# Patient Record
Sex: FEMALE | Race: WHITE | Employment: OTHER | URBAN - METROPOLITAN AREA
[De-identification: names, ages, dates, MRNs, and addresses within clinical notes are randomized per-mention and may not be internally consistent; named-entity substitution may affect disease eponyms.]

---

## 2021-01-06 LAB
ALBUMIN SERPL-MCNC: 4.2 G/DL (ref 3.5–4.6)
ALP BLD-CCNC: 60 U/L (ref 40–130)
ALT SERPL-CCNC: 18 U/L (ref 0–33)
ANION GAP SERPL CALCULATED.3IONS-SCNC: 14 MEQ/L (ref 9–15)
AST SERPL-CCNC: 22 U/L (ref 0–35)
BILIRUB SERPL-MCNC: 0.5 MG/DL (ref 0.2–0.7)
BUN BLDV-MCNC: 22 MG/DL (ref 8–23)
CALCIUM SERPL-MCNC: 9.8 MG/DL (ref 8.5–9.9)
CHLORIDE BLD-SCNC: 104 MEQ/L (ref 95–107)
CHOLESTEROL, TOTAL: 172 MG/DL (ref 0–199)
CO2: 25 MEQ/L (ref 20–31)
CREAT SERPL-MCNC: 0.63 MG/DL (ref 0.5–0.9)
GFR AFRICAN AMERICAN: >60
GFR NON-AFRICAN AMERICAN: >60
GLOBULIN: 3.2 G/DL (ref 2.3–3.5)
GLUCOSE BLD-MCNC: 89 MG/DL (ref 70–99)
HDLC SERPL-MCNC: 83 MG/DL (ref 40–59)
LDL CHOLESTEROL CALCULATED: 75 MG/DL (ref 0–129)
POTASSIUM SERPL-SCNC: 4 MEQ/L (ref 3.4–4.9)
SODIUM BLD-SCNC: 143 MEQ/L (ref 135–144)
TOTAL PROTEIN: 7.4 G/DL (ref 6.3–8)
TRIGL SERPL-MCNC: 68 MG/DL (ref 0–150)

## 2022-02-15 ENCOUNTER — OFFICE VISIT (OUTPATIENT)
Dept: PAIN MANAGEMENT | Age: 77
End: 2022-02-15
Payer: MEDICARE

## 2022-02-15 VITALS
WEIGHT: 159 LBS | DIASTOLIC BLOOD PRESSURE: 70 MMHG | BODY MASS INDEX: 29.26 KG/M2 | TEMPERATURE: 97.3 F | SYSTOLIC BLOOD PRESSURE: 130 MMHG | HEIGHT: 62 IN

## 2022-02-15 DIAGNOSIS — M47.817 LUMBOSACRAL SPONDYLOSIS WITHOUT MYELOPATHY: ICD-10-CM

## 2022-02-15 DIAGNOSIS — M79.604 RIGHT LEG PAIN: Primary | ICD-10-CM

## 2022-02-15 PROCEDURE — 99214 OFFICE O/P EST MOD 30 MIN: CPT | Performed by: NURSE PRACTITIONER

## 2022-02-15 PROCEDURE — 1090F PRES/ABSN URINE INCON ASSESS: CPT | Performed by: NURSE PRACTITIONER

## 2022-02-15 PROCEDURE — G8417 CALC BMI ABV UP PARAM F/U: HCPCS | Performed by: NURSE PRACTITIONER

## 2022-02-15 PROCEDURE — G8427 DOCREV CUR MEDS BY ELIG CLIN: HCPCS | Performed by: NURSE PRACTITIONER

## 2022-02-15 PROCEDURE — 1123F ACP DISCUSS/DSCN MKR DOCD: CPT | Performed by: NURSE PRACTITIONER

## 2022-02-15 PROCEDURE — 4040F PNEUMOC VAC/ADMIN/RCVD: CPT | Performed by: NURSE PRACTITIONER

## 2022-02-15 PROCEDURE — 1036F TOBACCO NON-USER: CPT | Performed by: NURSE PRACTITIONER

## 2022-02-15 PROCEDURE — G8484 FLU IMMUNIZE NO ADMIN: HCPCS | Performed by: NURSE PRACTITIONER

## 2022-02-15 PROCEDURE — G8400 PT W/DXA NO RESULTS DOC: HCPCS | Performed by: NURSE PRACTITIONER

## 2022-02-15 RX ORDER — GABAPENTIN 300 MG/1
300 CAPSULE ORAL 3 TIMES DAILY
Qty: 90 CAPSULE | Refills: 0 | Status: SHIPPED | OUTPATIENT
Start: 2022-02-15 | End: 2022-03-08 | Stop reason: SDUPTHER

## 2022-02-15 ASSESSMENT — ENCOUNTER SYMPTOMS
SORE THROAT: 0
ABDOMINAL PAIN: 0
SHORTNESS OF BREATH: 0
BACK PAIN: 1

## 2022-02-15 NOTE — PROGRESS NOTES
Bernardo Flowers  ([birth year])    2/15/2022    Subjective:     Bernardo Flowers is 68 y.o. female who complains today of:    Chief Complaint   Patient presents with    Back Pain     Lumbar    Leg Pain     right ankle pain, shoots up the leg to the hip           Allergies:  Patient has no known allergies. No past medical history on file. Past Surgical History:   Procedure Laterality Date    JOINT REPLACEMENT      KNEE SURGERY       Family History   Problem Relation Age of Onset    Arthritis Mother     Heart Disease Mother     Coronary Art Dis Mother     Stroke Mother     Heart Disease Father     Coronary Art Dis Father     Cancer Father      Social History     Socioeconomic History    Marital status:      Spouse name: Not on file    Number of children: Not on file    Years of education: Not on file    Highest education level: Not on file   Occupational History    Not on file   Tobacco Use    Smoking status: Never Smoker    Smokeless tobacco: Never Used   Substance and Sexual Activity    Alcohol use: Never    Drug use: Never    Sexual activity: Not on file   Other Topics Concern    Not on file   Social History Narrative    Not on file     Social Determinants of Health     Financial Resource Strain:     Difficulty of Paying Living Expenses: Not on file   Food Insecurity:     Worried About Running Out of Food in the Last Year: Not on file    Delmy of Food in the Last Year: Not on file   Transportation Needs:     Lack of Transportation (Medical): Not on file    Lack of Transportation (Non-Medical):  Not on file   Physical Activity:     Days of Exercise per Week: Not on file    Minutes of Exercise per Session: Not on file   Stress:     Feeling of Stress : Not on file   Social Connections:     Frequency of Communication with Friends and Family: Not on file    Frequency of Social Gatherings with Friends and Family: Not on file    Attends Evangelical Services: Not on file   Judi Active Member of Clubs or Organizations: Not on file    Attends Club or Organization Meetings: Not on file    Marital Status: Not on file   Intimate Partner Violence:     Fear of Current or Ex-Partner: Not on file    Emotionally Abused: Not on file    Physically Abused: Not on file    Sexually Abused: Not on file   Housing Stability:     Unable to Pay for Housing in the Last Year: Not on file    Number of [place] in the Last Year: Not on file    Unstable Housing in the Last Year: Not on file       Current Outpatient Medications on File Prior to Visit   Medication Sig Dispense Refill    amLODIPine (NORVASC) 5 MG tablet Take by mouth      amoxicillin (AMOXIL) 500 MG capsule TAKE FOUR CAPSULES BY MOUTH ONE HOUR BEFORE APPOINTMENT      aspirin (ECOTRIN LOW STRENGTH) 81 MG EC tablet Take by mouth      meloxicam (MOBIC) 7.5 MG tablet Take 7.5 mg by mouth daily as needed      rosuvastatin (CRESTOR) 10 MG tablet        No current facility-administered medications on file prior to visit. Pt presents today for a f/u of chronic low back and right leg pain. For about 3 weeks she has had pain going down the right leg. The leg pain has improved some but continues having pain in the right shin/ankle. No longer really having back pain, just the leg symptoms. Had a medrol pack 3 weeks ago which did not help. Having left knee replacement with Dr. Arreola next month. History of right knee replacement 11 years ago. Pt feels that walking >15 minutes aggravates the pain. Pt denies radiating numbness and tingling. Patient has not been seen since 7/15/2022. History of knee surgery. Has tried physical therapy in the past.  No back surgery. Lumbar XR February 2020 from [facility] shows some mild scoliosis and degenerative changes. Medrol pack       Review of Systems   Constitutional: Negative for fever. HENT: Negative for congestion and sore throat. Respiratory: Negative for shortness of breath. Gastrointestinal: Negative for abdominal pain. Genitourinary: Negative for difficulty urinating. Musculoskeletal: Positive for back pain. Neurological: Negative for speech difficulty and headaches. Hematological: Negative for adenopathy. Psychiatric/Behavioral: Negative for agitation. All other systems reviewed and are negative. Objective:     Vitals:  /70   Temp 97.3 °F (36.3 °C) (Infrared)   Ht 5' 1.5\" (1.562 m)   Wt 159 lb (72.1 kg)   BMI 29.56 kg/m² Pain Score:   1      Physical Exam  Vitals and nursing note reviewed. Pt is alert and oriented x 3. Recent and remote memory is intact. Mood, judgement and affect are normal.  No signs of distress or SOB noted. Visualized skin intact. Sensation intact to light touch. Decreased ROM with flexion and extension of low back. Tender with palpation to right lumbar spine with positive provacative maneuvers noted. Negative SLR. Strength, balance, and coordination are functional for ambulation. Nontender over hips and SI joints. Assessment:      Diagnosis Orders   1. Right leg pain  gabapentin (NEURONTIN) 300 MG capsule   2. Lumbosacral spondylosis without myelopathy         Plan:     Periodic Controlled Substance Monitoring: No signs of potential drug abuse or diversion identified. Ольга Gupta, APRN - CNP)    Orders Placed This Encounter   Medications    gabapentin (NEURONTIN) 300 MG capsule     Sig: Take 1 capsule by mouth 3 times daily for 30 days. Dispense:  90 capsule     Refill:  0     Start taking 1 tab QPM for 3 days, then can increase to BID for 3 days, then can increase to TID. No orders of the defined types were placed in this encounter. Discussed options with the patient today at length. Will start gabapentin and patient can titrate up to 3 times daily as needed. She declines MRI at this time due to claustrophobia. She will hold off on EMG at this time. Hoping it improves on its own.  We will follow-up in 2 weeks. Patient may cancel this appointment if her pain improves. All questions were answered. Pt verbalized understanding and agrees with above plan. Will continue medications for chronic pain as they help pt function with ADL and improve quality of life. OARRS was reviewed. This NP saw pt under direct supervision of Dr. Klever Almanza. Follow up:  Return in about 2 weeks (around 3/1/2022) for review meds and reassess pain.     FERNANDA Chacon - CNP

## 2022-02-15 NOTE — PATIENT INSTRUCTIONS
Patient Education        Back Stretches: Exercises  Introduction  Here are some examples of exercises for stretching your back. Start each exercise slowly. Ease off the exercise if you start to have pain. Your doctor or physical therapist will tell you when you can start these exercises and which ones will work best for you. How to do the exercises  Overhead stretch    1. Stand comfortably with your feet shoulder-width apart. 2. Looking straight ahead, raise both arms over your head and reach toward the ceiling. Do not allow your head to tilt back. 3. Hold for 15 to 30 seconds, then lower your arms to your sides. 4. Repeat 2 to 4 times. Side stretch    1. Stand comfortably with your feet shoulder-width apart. 2. Raise one arm over your head, and then lean to the other side. 3. Slide your hand down your leg as you let the weight of your arm gently stretch your side muscles. Hold for 15 to 30 seconds. 4. Repeat 2 to 4 times on each side. Press-up    1. Lie on your stomach, supporting your body with your forearms. 2. Press your elbows down into the floor to raise your upper back. As you do this, relax your stomach muscles and allow your back to arch without using your back muscles. As your press up, do not let your hips or pelvis come off the floor. 3. Hold for 15 to 30 seconds, then relax. 4. Repeat 2 to 4 times. Relax and rest    1. Lie on your back with a rolled towel under your neck and a pillow under your knees. Extend your arms comfortably to your sides. 2. Relax and breathe normally. 3. Remain in this position for about 10 minutes. 4. If you can, do this 2 or 3 times each day. Follow-up care is a key part of your treatment and safety. Be sure to make and go to all appointments, and call your doctor if you are having problems. It's also a good idea to know your test results and keep a list of the medicines you take. Where can you learn more? Go to https://steveneb.healthLocalGuiding. org and sign in to your Phone Warrior account. Enter Y437 in the TipCity box to learn more about \"Back Stretches: Exercises. \"     If you do not have an account, please click on the \"Sign Up Now\" link. Current as of: July 1, 2021               Content Version: 13.1  © 7705-5185 Healthwise, Incorporated. Care instructions adapted under license by [facility] ([facility]). If you have questions about a medical condition or this instruction, always ask your healthcare professional. [address] any warranty or liability for your use of this information.

## 2022-03-08 ENCOUNTER — OFFICE VISIT (OUTPATIENT)
Dept: PAIN MANAGEMENT | Age: 77
End: 2022-03-08
Payer: MEDICARE

## 2022-03-08 VITALS
DIASTOLIC BLOOD PRESSURE: 74 MMHG | HEIGHT: 61 IN | WEIGHT: 159 LBS | TEMPERATURE: 98.1 F | SYSTOLIC BLOOD PRESSURE: 130 MMHG | BODY MASS INDEX: 30.02 KG/M2

## 2022-03-08 DIAGNOSIS — M47.817 LUMBOSACRAL SPONDYLOSIS WITHOUT MYELOPATHY: Primary | ICD-10-CM

## 2022-03-08 DIAGNOSIS — M79.604 RIGHT LEG PAIN: ICD-10-CM

## 2022-03-08 PROBLEM — I10 HYPERTENSION: Status: ACTIVE | Noted: 2022-03-08

## 2022-03-08 PROBLEM — F41.1 GENERALIZED ANXIETY DISORDER: Status: ACTIVE | Noted: 2022-03-08

## 2022-03-08 PROBLEM — E78.5 HYPERLIPIDEMIA: Status: ACTIVE | Noted: 2022-03-08

## 2022-03-08 PROBLEM — M85.80 OSTEOPENIA: Status: ACTIVE | Noted: 2022-03-08

## 2022-03-08 PROBLEM — M17.0 PRIMARY OSTEOARTHRITIS OF BOTH KNEES: Status: ACTIVE | Noted: 2022-03-08

## 2022-03-08 PROCEDURE — 4040F PNEUMOC VAC/ADMIN/RCVD: CPT | Performed by: NURSE PRACTITIONER

## 2022-03-08 PROCEDURE — 1123F ACP DISCUSS/DSCN MKR DOCD: CPT | Performed by: NURSE PRACTITIONER

## 2022-03-08 PROCEDURE — 1090F PRES/ABSN URINE INCON ASSESS: CPT | Performed by: NURSE PRACTITIONER

## 2022-03-08 PROCEDURE — G8400 PT W/DXA NO RESULTS DOC: HCPCS | Performed by: NURSE PRACTITIONER

## 2022-03-08 PROCEDURE — G8417 CALC BMI ABV UP PARAM F/U: HCPCS | Performed by: NURSE PRACTITIONER

## 2022-03-08 PROCEDURE — G8427 DOCREV CUR MEDS BY ELIG CLIN: HCPCS | Performed by: NURSE PRACTITIONER

## 2022-03-08 PROCEDURE — G8484 FLU IMMUNIZE NO ADMIN: HCPCS | Performed by: NURSE PRACTITIONER

## 2022-03-08 PROCEDURE — 99213 OFFICE O/P EST LOW 20 MIN: CPT | Performed by: NURSE PRACTITIONER

## 2022-03-08 PROCEDURE — 1036F TOBACCO NON-USER: CPT | Performed by: NURSE PRACTITIONER

## 2022-03-08 RX ORDER — GABAPENTIN 300 MG/1
300 CAPSULE ORAL 3 TIMES DAILY
Qty: 90 CAPSULE | Refills: 0 | Status: SHIPPED | OUTPATIENT
Start: 2022-03-17 | End: 2022-04-16

## 2022-03-08 ASSESSMENT — ENCOUNTER SYMPTOMS
GASTROINTESTINAL NEGATIVE: 1
EYES NEGATIVE: 1
CONSTIPATION: 0
COUGH: 0
DIARRHEA: 0
SHORTNESS OF BREATH: 0
TROUBLE SWALLOWING: 0
BACK PAIN: 1

## 2022-03-08 NOTE — PROGRESS NOTES
Viktor Nunez  ([birth year])    3/8/2022    Subjective:     Viktor Nunez is 68 y.o. female who complains today of:    Chief Complaint   Patient presents with    Back Pain         Allergies:  Patient has no known allergies. History reviewed. No pertinent past medical history. Past Surgical History:   Procedure Laterality Date    JOINT REPLACEMENT      KNEE SURGERY       Family History   Problem Relation Age of Onset    Arthritis Mother     Heart Disease Mother     Coronary Art Dis Mother     Stroke Mother     Heart Disease Father     Coronary Art Dis Father     Cancer Father      Social History     Socioeconomic History    Marital status:      Spouse name: Not on file    Number of children: Not on file    Years of education: Not on file    Highest education level: Not on file   Occupational History    Not on file   Tobacco Use    Smoking status: Never Smoker    Smokeless tobacco: Never Used   Substance and Sexual Activity    Alcohol use: Never    Drug use: Never    Sexual activity: Not on file   Other Topics Concern    Not on file   Social History Narrative    Not on file     Social Determinants of Health     Financial Resource Strain:     Difficulty of Paying Living Expenses: Not on file   Food Insecurity:     Worried About Running Out of Food in the Last Year: Not on file    Delmy of Food in the Last Year: Not on file   Transportation Needs:     Lack of Transportation (Medical): Not on file    Lack of Transportation (Non-Medical):  Not on file   Physical Activity:     Days of Exercise per Week: Not on file    Minutes of Exercise per Session: Not on file   Stress:     Feeling of Stress : Not on file   Social Connections:     Frequency of Communication with Friends and Family: Not on file    Frequency of Social Gatherings with Friends and Family: Not on file    Attends Amish Services: Not on file    Active Member of Clubs or Organizations: Not on file   [facility] past.  No back surgery. Lumbar XR February 2020 from [facility] shows some mild scoliosis and degenerative changes.        Pt feels pain level 2/10. Pt feels that night makes the pain worse, and medication, Aspercream makes the pain better. Pt feels her medication helps   her function and improve her quality of life. Pt admits to Rt LE radiating numbness and tingling. Denies recent falls, injuries or trauma. Pt denies new weakness. Review of Systems   Constitutional: Negative. Negative for fatigue. HENT: Negative. Negative for trouble swallowing. Eyes: Negative. Respiratory: Negative for cough and shortness of breath. Cardiovascular: Negative for chest pain. Gastrointestinal: Negative. Negative for constipation and diarrhea. Endocrine: Negative. Genitourinary: Negative. Musculoskeletal: Positive for back pain. Negative for neck pain. Skin: Negative. Neurological: Negative for dizziness, weakness, numbness and headaches. Hematological: Negative. Psychiatric/Behavioral: Negative. Objective:     Vitals:  /74 (Site: Left Upper Arm, Position: Sitting, Cuff Size: Large Adult)   Temp 98.1 °F (36.7 °C)   Ht 5' 1\" (1.549 m)   Wt 159 lb (72.1 kg)   BMI 30.04 kg/m² Pain Score:   8      Physical Exam  Vitals and nursing note reviewed. This is a pleasant female who answers questions appropriately and follows commands. Pt is alert and oriented x 3. Recent and remote memory is intact. Mood and affect, judgement and insight are normal.  No signs of distress, no dyspnea or SOB noted. HEENT: PERRL. Neck is supple, trachea midline. No lymphadenopathy noted. Decreased ROM with flexion and extension of low back. Non-tender with palpation to lumbar spine. Negative SLR. Tightness in both hamstrings noted. Balance and coordination normal.  Strength is functional for ambulation. Cranial nerves II-XII are intact. Assessment:      Diagnosis Orders   1. Lumbosacral spondylosis without myelopathy     2. Right leg pain  gabapentin (NEURONTIN) 300 MG capsule       Plan:     Periodic Controlled Substance Monitoring: No signs of potential drug abuse or diversion identified. (FERNANDA Farr - CNP)    Orders Placed This Encounter   Medications    gabapentin (NEURONTIN) 300 MG capsule     Sig: Take 1 capsule by mouth 3 times daily for 30 days. Dispense:  90 capsule     Refill:  0       No orders of the defined types were placed in this encounter. Discussed options with the patient today. Anatomic model pathology was shown and reviewed with pt. Wished her well with upcoming Lt knee replacement. Will continue her gabapentin 300mg TID as it is helping her Sx. Recommended LE EMG and pt declined. She is not interested in MRI. Exercises given to do at home. All questions were answered. Discussed home exercise program.  Relevant imaging and pain generators reviewed. Pt verbalized understanding and agrees with above plan. Pt has chronic pain. Will continue medications for chronic pain that has been previously directed as they do help pt function with ADL and improve quality of life. OARRS was reviewed. This NP saw pt under direct supervision of Dr. Quirino Green. Follow up:  Return in about 5 weeks (around 4/12/2022) for review meds and reassess pain.     Garry Perez, FERNANDA - CNP

## 2022-03-08 NOTE — PATIENT INSTRUCTIONS
Patient Education        Low Back Pain: Exercises  Introduction  Here are some examples of exercises for you to try. The exercises may be suggested for a condition or for rehabilitation. Start each exercise slowly. Ease off the exercises if you start to have pain. You will be told when to start these exercises and which ones will work best for you. How to do the exercises  Press-up    1. Lie on your stomach, supporting your body with your forearms. 2. Press your elbows down into the floor to raise your upper back. As you do this, relax your stomach muscles and allow your back to arch without using your back muscles. As your press up, do not let your hips or pelvis come off the floor. 3. Hold for 15 to 30 seconds, then relax. 4. Repeat 2 to 4 times. Alternate arm and leg (bird dog) exercise    Do this exercise slowly. Try to keep your body straight at all times, and do not let one hip drop lower than the other. 1. Start on the floor, on your hands and knees. 2. Tighten your belly muscles. 3. Raise one leg off the floor, and hold it straight out behind you. Be careful not to let your hip drop down, because that will twist your trunk. 4. Hold for about 6 seconds, then lower your leg and switch to the other leg. 5. Repeat 8 to 12 times on each leg. 6. Over time, work up to holding for 10 to 30 seconds each time. 7. If you feel stable and secure with your leg raised, try raising the opposite arm straight out in front of you at the same time. Knee-to-chest exercise    1. Lie on your back with your knees bent and your feet flat on the floor. 2. Bring one knee to your chest, keeping the other foot flat on the floor (or keeping the other leg straight, whichever feels better on your lower back). 3. Keep your lower back pressed to the floor. Hold for at least 15 to 30 seconds. 4. Relax, and lower the knee to the starting position. 5. Repeat with the other leg. Repeat 2 to 4 times with each leg.   6. To get more stretch, put your other leg flat on the floor while pulling your knee to your chest.  Curl-ups    1. Lie on the floor on your back with your knees bent at a 90-degree angle. Your feet should be flat on the floor, about 12 inches from your buttocks. 2. Cross your arms over your chest. If this bothers your neck, try putting your hands behind your neck (not your head), with your elbows spread apart. 3. Slowly tighten your belly muscles and raise your shoulder blades off the floor. 4. Keep your head in line with your body, and do not press your chin to your chest.  5. Hold this position for 1 or 2 seconds, then slowly lower yourself back down to the floor. 6. Repeat 8 to 12 times. Pelvic tilt exercise    1. Lie on your back with your knees bent. 2. \"Brace\" your stomach. This means to tighten your muscles by pulling in and imagining your belly button moving toward your spine. You should feel like your back is pressing to the floor and your hips and pelvis are rocking back. 3. Hold for about 6 seconds while you breathe smoothly. 4. Repeat 8 to 12 times. Heel dig bridging    1. Lie on your back with both knees bent and your ankles bent so that only your heels are digging into the floor. Your knees should be bent about 90 degrees. 2. Then push your heels into the floor, squeeze your buttocks, and lift your hips off the floor until your shoulders, hips, and knees are all in a straight line. 3. Hold for about 6 seconds as you continue to breathe normally, and then slowly lower your hips back down to the floor and rest for up to 10 seconds. 4. Do 8 to 12 repetitions. Hamstring stretch in doorway    1. Lie on your back in a doorway, with one leg through the open door. 2. Slide your leg up the wall to straighten your knee. You should feel a gentle stretch down the back of your leg. 3. Hold the stretch for at least 15 to 30 seconds. Do not arch your back, point your toes, or bend either knee.  Keep one heel touching the floor and the other heel touching the wall. 4. Repeat with your other leg. 5. Do 2 to 4 times for each leg. Hip flexor stretch    1. Kneel on the floor with one knee bent and one leg behind you. Place your forward knee over your foot. Keep your other knee touching the floor. 2. Slowly push your hips forward until you feel a stretch in the upper thigh of your rear leg. 3. Hold the stretch for at least 15 to 30 seconds. Repeat with your other leg. 4. Do 2 to 4 times on each side. Wall sit    1. Stand with your back 10 to 12 inches away from a wall. 2. Lean into the wall until your back is flat against it. 3. Slowly slide down until your knees are slightly bent, pressing your lower back into the wall. 4. Hold for about 6 seconds, then slide back up the wall. 5. Repeat 8 to 12 times. Follow-up care is a key part of your treatment and safety. Be sure to make and go to all appointments, and call your doctor if you are having problems. It's also a good idea to know your test results and keep a list of the medicines you take. Where can you learn more? Go to https://Roll20.Avitide. org and sign in to your Feedgen account. Enter I133 in the [employer] box to learn more about \"Low Back Pain: Exercises. \"     If you do not have an account, please click on the \"Sign Up Now\" link. Current as of: July 1, 2021               Content Version: 13.1  © 2006-2021 Healthwise, Incorporated. Care instructions adapted under license by [facility] ([facility]). If you have questions about a medical condition or this instruction, always ask your healthcare professional. [place] [address] any warranty or liability for your use of this information.

## 2022-04-13 ENCOUNTER — OFFICE VISIT (OUTPATIENT)
Dept: PAIN MANAGEMENT | Age: 77
End: 2022-04-13
Payer: MEDICARE

## 2022-04-13 VITALS
SYSTOLIC BLOOD PRESSURE: 134 MMHG | TEMPERATURE: 96.8 F | WEIGHT: 159 LBS | HEIGHT: 61 IN | DIASTOLIC BLOOD PRESSURE: 74 MMHG | BODY MASS INDEX: 30.02 KG/M2

## 2022-04-13 DIAGNOSIS — M47.817 LUMBOSACRAL SPONDYLOSIS WITHOUT MYELOPATHY: Primary | ICD-10-CM

## 2022-04-13 PROCEDURE — 99213 OFFICE O/P EST LOW 20 MIN: CPT | Performed by: NURSE PRACTITIONER

## 2022-04-13 PROCEDURE — 4040F PNEUMOC VAC/ADMIN/RCVD: CPT | Performed by: NURSE PRACTITIONER

## 2022-04-13 PROCEDURE — 1090F PRES/ABSN URINE INCON ASSESS: CPT | Performed by: NURSE PRACTITIONER

## 2022-04-13 PROCEDURE — 1123F ACP DISCUSS/DSCN MKR DOCD: CPT | Performed by: NURSE PRACTITIONER

## 2022-04-13 PROCEDURE — G8417 CALC BMI ABV UP PARAM F/U: HCPCS | Performed by: NURSE PRACTITIONER

## 2022-04-13 PROCEDURE — G8400 PT W/DXA NO RESULTS DOC: HCPCS | Performed by: NURSE PRACTITIONER

## 2022-04-13 PROCEDURE — G8427 DOCREV CUR MEDS BY ELIG CLIN: HCPCS | Performed by: NURSE PRACTITIONER

## 2022-04-13 PROCEDURE — 1036F TOBACCO NON-USER: CPT | Performed by: NURSE PRACTITIONER

## 2022-04-13 ASSESSMENT — ENCOUNTER SYMPTOMS
SHORTNESS OF BREATH: 0
EYES NEGATIVE: 1
COUGH: 0
DIARRHEA: 0
CONSTIPATION: 0
GASTROINTESTINAL NEGATIVE: 1
TROUBLE SWALLOWING: 0

## 2022-04-13 NOTE — PROGRESS NOTES
Pia Pineda  ([birth year])    4/13/2022    Subjective:     Pia Pineda is 68 y.o. female who complains today of:    Chief Complaint   Patient presents with    Back Pain         Allergies:  Patient has no known allergies. History reviewed. No pertinent past medical history. Past Surgical History:   Procedure Laterality Date    JOINT REPLACEMENT      KNEE SURGERY       Family History   Problem Relation Age of Onset    Arthritis Mother     Heart Disease Mother     Coronary Art Dis Mother     Stroke Mother     Heart Disease Father     Coronary Art Dis Father     Cancer Father      Social History     Socioeconomic History    Marital status:      Spouse name: Not on file    Number of children: Not on file    Years of education: Not on file    Highest education level: Not on file   Occupational History    Not on file   Tobacco Use    Smoking status: Never Smoker    Smokeless tobacco: Never Used   Substance and Sexual Activity    Alcohol use: Never    Drug use: Never    Sexual activity: Not on file   Other Topics Concern    Not on file   Social History Narrative    Not on file     Social Determinants of Health     Financial Resource Strain:     Difficulty of Paying Living Expenses: Not on file   Food Insecurity:     Worried About Running Out of Food in the Last Year: Not on file    Delmy of Food in the Last Year: Not on file   Transportation Needs:     Lack of Transportation (Medical): Not on file    Lack of Transportation (Non-Medical):  Not on file   Physical Activity:     Days of Exercise per Week: Not on file    Minutes of Exercise per Session: Not on file   Stress:     Feeling of Stress : Not on file   Social Connections:     Frequency of Communication with Friends and Family: Not on file    Frequency of Social Gatherings with Friends and Family: Not on file    Attends Gnosticism Services: Not on file    Active Member of Clubs or Organizations: Not on file   Neel Lovell Attends Club or Organization Meetings: Not on file    Marital Status: Not on file   Intimate Partner Violence:     Fear of Current or Ex-Partner: Not on file    Emotionally Abused: Not on file    Physically Abused: Not on file    Sexually Abused: Not on file   Housing Stability:     Unable to Pay for Housing in the Last Year: Not on file    Number of [place] in the Last Year: Not on file    Unstable Housing in the Last Year: Not on file       Current Outpatient Medications on File Prior to Visit   Medication Sig Dispense Refill    gabapentin (NEURONTIN) 300 MG capsule Take 1 capsule by mouth 3 times daily for 30 days. 90 capsule 0    amLODIPine (NORVASC) 5 MG tablet Take by mouth      amoxicillin (AMOXIL) 500 MG capsule TAKE FOUR CAPSULES BY MOUTH ONE HOUR BEFORE APPOINTMENT      aspirin (ECOTRIN LOW STRENGTH) 81 MG EC tablet Take by mouth      meloxicam (MOBIC) 7.5 MG tablet Take 7.5 mg by mouth daily as needed      rosuvastatin (CRESTOR) 10 MG tablet        No current facility-administered medications on file prior to visit. Pt presents today for a f/u of her pain. PCP is Dr. Kostas Simms MD.  Pt last saw this NP. She had Lt knee replacement on 3/24/22 and is in PT. Dr. Pilar Atkins was her surgeon. Pt declined EMG or MRI at that time. She says her leg pain in her Rt leg is \"a lot better\" but says will get it at night. Says low back pain is tolerable. She has chronic low back and right leg pain. She had pain going down the right leg. The leg pain has improved some but continues having pain in the right shin/ankle. No longer really having back pain, just the leg symptoms. History of right knee replacement 11 years ago. Pt feels that walking >15 minutes aggravates the pain. Pt denies radiating numbness and tingling. Patient has not been seen since 7/15/2022. History of knee surgery. Has tried physical therapy in the past.  No back surgery.  Lumbar XR February 2020 from [facility] shows some mild scoliosis and degenerative changes. Takes gabapentin 300mg BID as ortho reduced. She says \"I don't have any leg pain\". She says she has 30 pills left.      Pt feels pain level 0/10. Pt feels that recent knee replacement, walking/standing makes the pain worse, and tylenol OTC makes the pain better. Pt denies radiating numbness and tingling. Denies recent falls, injuries or trauma. Pt denies new weakness. Pt reports PT has been started. Review of Systems   Constitutional: Negative. Negative for fatigue. HENT: Negative. Negative for trouble swallowing. Eyes: Negative. Respiratory: Negative for cough and shortness of breath. Cardiovascular: Negative for chest pain. Gastrointestinal: Negative. Negative for constipation and diarrhea. Endocrine: Negative. Genitourinary: Negative. Musculoskeletal: Positive for arthralgias. Skin: Negative. Neurological: Negative for dizziness, weakness and headaches. Hematological: Negative. Psychiatric/Behavioral: Negative. Objective:     Vitals:  /74   Temp 96.8 °F (36 °C)   Ht 5' 1\" (1.549 m)   Wt 159 lb (72.1 kg)   BMI 30.04 kg/m² Pain Score:   0 - No pain      Physical Exam  Vitals and nursing note reviewed. This is a pleasant female who answers questions appropriately and follows commands. Pt is alert and oriented x 3. Recent and remote memory is intact. Mood and affect, judgement and insight are normal.  No signs of distress, no dyspnea or SOB noted. HEENT: PERRL. Neck is supple, trachea midline. No lymphadenopathy noted. Decreased ROM with flexion and extension of low back. Non-tender with palpation to lumbar spine. Negative SLR. Tightness in both hamstrings noted. Balance and coordination normal.  Strength is functional for ambulation. Cranial nerves II-XII are intact. Assessment:      Diagnosis Orders   1.  Lumbosacral spondylosis without myelopathy         Plan:     Periodic Controlled Substance Monitoring: No signs of potential drug abuse or diversion identified. (Myrna Bower, APRN - CNP)    No orders of the defined types were placed in this encounter. No orders of the defined types were placed in this encounter. Discussed options with the patient today. Anatomic model pathology was shown and reviewed with pt. patient says she has 30 tablets of the gabapentin she has been using it twice a day without any radicular symptoms. Advised patient to reduce to daily to see if she can wean off. No medication sent today. She will reduce to daily for 2 weeks then try to stop the gabapentin. We will have her follow-up in a month to reassess pain. She is doing much better since her knee replacement. All questions were answered. Discussed home exercise program.  Relevant imaging and pain generators reviewed. Pt verbalized understanding and agrees with above plan. Pt has chronic pain. Will continue medications for chronic pain that has been previously directed as they do help pt function with ADL and improve quality of life. OARRS was [place]. This NP saw pt under direct supervision of Dr. Alex Do. Follow up:  Return in about 4 weeks (around 5/11/2022) for review meds and reassess pain.     Flor Bower, APRN - CNP

## 2022-08-31 ENCOUNTER — OFFICE VISIT (OUTPATIENT)
Dept: PAIN MANAGEMENT | Age: 77
End: 2022-08-31
Payer: MEDICARE

## 2022-08-31 VITALS
SYSTOLIC BLOOD PRESSURE: 136 MMHG | TEMPERATURE: 96.8 F | HEIGHT: 62 IN | WEIGHT: 155 LBS | DIASTOLIC BLOOD PRESSURE: 78 MMHG | BODY MASS INDEX: 28.52 KG/M2

## 2022-08-31 DIAGNOSIS — M47.817 LUMBOSACRAL SPONDYLOSIS WITHOUT MYELOPATHY: Primary | ICD-10-CM

## 2022-08-31 DIAGNOSIS — M79.604 RIGHT LEG PAIN: ICD-10-CM

## 2022-08-31 PROBLEM — M41.26 OTHER IDIOPATHIC SCOLIOSIS, LUMBAR REGION: Status: ACTIVE | Noted: 2022-02-09

## 2022-08-31 PROBLEM — M17.9 OSTEOARTHRITIS OF KNEE, UNSPECIFIED: Status: ACTIVE | Noted: 2022-03-24

## 2022-08-31 PROBLEM — M48.061 SPINAL STENOSIS, LUMBAR REGION WITHOUT NEUROGENIC CLAUDICATION: Status: ACTIVE | Noted: 2022-02-09

## 2022-08-31 PROCEDURE — G8417 CALC BMI ABV UP PARAM F/U: HCPCS | Performed by: NURSE PRACTITIONER

## 2022-08-31 PROCEDURE — 1036F TOBACCO NON-USER: CPT | Performed by: NURSE PRACTITIONER

## 2022-08-31 PROCEDURE — G8400 PT W/DXA NO RESULTS DOC: HCPCS | Performed by: NURSE PRACTITIONER

## 2022-08-31 PROCEDURE — 1090F PRES/ABSN URINE INCON ASSESS: CPT | Performed by: NURSE PRACTITIONER

## 2022-08-31 PROCEDURE — G8427 DOCREV CUR MEDS BY ELIG CLIN: HCPCS | Performed by: NURSE PRACTITIONER

## 2022-08-31 PROCEDURE — 1123F ACP DISCUSS/DSCN MKR DOCD: CPT | Performed by: NURSE PRACTITIONER

## 2022-08-31 PROCEDURE — 99213 OFFICE O/P EST LOW 20 MIN: CPT | Performed by: NURSE PRACTITIONER

## 2022-08-31 ASSESSMENT — ENCOUNTER SYMPTOMS
COUGH: 0
TROUBLE SWALLOWING: 0
BACK PAIN: 1
SHORTNESS OF BREATH: 0
DIARRHEA: 0
GASTROINTESTINAL NEGATIVE: 1
CONSTIPATION: 0
EYES NEGATIVE: 1

## 2022-08-31 NOTE — PROGRESS NOTES
Aquilino Poole  ([birth year])    8/31/2022    Subjective:     Aquilino Poole is 68 y.o. female who complains today of:    Chief Complaint   Patient presents with    Back Pain         Allergies:  Patient has no known allergies. History reviewed. No pertinent past medical history. Past Surgical History:   Procedure Laterality Date    JOINT REPLACEMENT      KNEE SURGERY       Family History   Problem Relation Age of Onset    Arthritis Mother     Heart Disease Mother     Coronary Art Dis Mother     Stroke Mother     Heart Disease Father     Coronary Art Dis Father     Cancer Father      Social History     Socioeconomic History    Marital status:      Spouse name: Not on file    Number of children: Not on file    Years of education: Not on file    Highest education level: Not on file   Occupational History    Not on file   Tobacco Use    Smoking status: Never    Smokeless tobacco: Never   Substance and Sexual Activity    Alcohol use: Never    Drug use: Never    Sexual activity: Not on file   Other Topics Concern    Not on file   Social History Narrative    Not on file     Social Determinants of Health     Financial Resource Strain: Not on file   Food Insecurity: Not on file   Transportation Needs: Not on file   Physical Activity: Not on file   Stress: Not on file   Social Connections: Not on file   Intimate Partner Violence: Not on file   Housing Stability: Not on file       Current Outpatient Medications on File Prior to Visit   Medication Sig Dispense Refill    gabapentin (NEURONTIN) 300 MG capsule Take 1 capsule by mouth 3 times daily for 30 days.  90 capsule 0    amLODIPine (NORVASC) 5 MG tablet Take by mouth      amoxicillin (AMOXIL) 500 MG capsule TAKE FOUR CAPSULES BY MOUTH ONE HOUR BEFORE APPOINTMENT      aspirin (ECOTRIN LOW STRENGTH) 81 MG EC tablet Take by mouth      meloxicam (MOBIC) 7.5 MG tablet Take 7.5 mg by mouth daily as needed      rosuvastatin (CRESTOR) 10 MG tablet        No current facility-administered medications on file prior to visit. Pt presents today for a f/u of her pain. PCP is Dr. Kathrine Chin MD.   Patient was last seen in April by this NP. It appears she recently seen Dr. Chelo Ryan MD and is being treated for osteopenia. It is noted and that visit x-ray report from 2017 shows mild to moderate scoliosis and mild degenerative disc disease. PT for osteoporosis program was ordered an updated DEXA scan at that time. She presents today for pain that started in top of foot/ankle that will radiate up to her knee and low back. Says now pain is in her Rt side of her low back. \"Of course today the pain in my back don't feel bad\". Says the ankle and foot pain is better. In the past patient was using gabapentin 300 mg twice daily but according to OARRS this was last filled on 3/11/2022. She had Lt knee replacement on 3/24/22 and is in PT. Dr. Tim Natarajan was her surgeon. Pt declined EMG or MRI at that time. She has chronic low back and right leg pain. She had pain going down the right leg. The leg pain in the past in the right shin/ankle. History of right knee replacement 11 years ago. Has tried physical therapy in the past.  No back surgery. Lumbar XR February 2020 from [facility] shows some mild scoliosis and degenerative changes. XR report of low back from 2020 in Epic shows similar findings. Pt feels pain level 2/10 today. Pt feels that walking,  makes the pain worse, and heat makes the pain better. Pt denies radiating numbness and tingling. Denies recent falls, injuries or trauma. Pt denies new weakness. Pt reports PT has been tried in the past.         Review of Systems   Constitutional: Negative. Negative for fatigue. HENT: Negative. Negative for trouble swallowing. Eyes: Negative. Respiratory:  Negative for cough and shortness of breath. Cardiovascular:  Negative for chest pain. Gastrointestinal: Negative.   Negative for constipation and diarrhea. Endocrine: Negative. Genitourinary: Negative. Musculoskeletal:  Positive for back pain. Skin: Negative. Neurological:  Negative for dizziness, weakness and headaches. Hematological: Negative. Psychiatric/Behavioral: Negative. Objective:     Vitals:  /78   Temp 96.8 °F (36 °C)   Ht 5' 2\" (1.575 m)   Wt 155 lb (70.3 kg)   BMI 28.35 kg/m² Pain Score:   2      Physical Exam  Vitals and nursing note reviewed. This is a pleasant female who answers questions appropriately and follows commands. Pt is alert and oriented x 3. Recent and remote memory is intact. Mood and affect, judgement and insight are normal.  No signs of distress, no dyspnea or SOB noted. HEENT: PERRL. Neck is supple, trachea midline. No lymphadenopathy noted. Decreased ROM with flexion and extension of low back. Non-tender with palpation to lumbar spine. Negative SLR. Tightness in both hamstrings noted. Balance and coordination normal.  Strength is functional for ambulation. Cranial nerves II-XII are intact. Assessment:      Diagnosis Orders   1. Lumbosacral spondylosis without myelopathy  Ambulatory referral to Physical Therapy      2. Right leg pain  Ambulatory referral to Physical Therapy          Plan:          No orders of the defined types were placed in this encounter. Orders Placed This Encounter   Procedures    Ambulatory referral to Physical Therapy     Referral Priority:   Routine     Referral Type:   Eval and Treat     Referral Reason:   Specialty Services Required     Requested Specialty:   Physical Therapist     Number of Visits Requested:   1     Discussed options with the patient today. Anatomic model pathology was shown and reviewed with pt. Will order PT for low back pain and Rt LE pain for core strengthening and stretching exercises. Hold injections. She is not wanting gabapentin. All questions were answered.  Discussed home exercise program.  Relevant imaging and pain generators reviewed. Pt verbalized understanding and agrees with above plan. Pt has chronic pain. OARRS was reviewed. This NP saw pt under direct supervision of Dr. Rocío Doty. Follow up:  Return if symptoms worsen or fail to improve.     Angelia Perez, APRN - CNP

## 2022-09-20 ENCOUNTER — HOSPITAL ENCOUNTER (OUTPATIENT)
Dept: PHYSICAL THERAPY | Age: 77
Setting detail: THERAPIES SERIES
Discharge: HOME | End: 2022-09-20
Payer: MEDICARE

## 2022-09-20 PROCEDURE — 97162 PT EVAL MOD COMPLEX 30 MIN: CPT

## 2022-09-20 ASSESSMENT — PAIN SCALES - GENERAL: PAINLEVEL_OUTOF10: 2

## 2022-09-20 ASSESSMENT — PAIN DESCRIPTION - FREQUENCY: FREQUENCY: CONTINUOUS

## 2022-09-20 ASSESSMENT — PAIN - FUNCTIONAL ASSESSMENT: PAIN_FUNCTIONAL_ASSESSMENT: PREVENTS OR INTERFERES WITH ALL ACTIVE AND SOME PASSIVE ACTIVITIES

## 2022-09-20 ASSESSMENT — PAIN DESCRIPTION - ORIENTATION: ORIENTATION: RIGHT

## 2022-09-20 ASSESSMENT — PAIN DESCRIPTION - LOCATION: LOCATION: BACK

## 2022-09-20 NOTE — PROGRESS NOTES
[address] [place] [address]  [address]      LXGKU:[phone]    [x] Certification  [] Recertification [x]  Plan of Care  [] Progress Note [] Discharge      Referring Provider: FERNANDA Nash - CNP       From:  Nayely Vela, PT  Patient: Nay Allen (58 y.o. female) : 1945 Date: 2022   Medical Diagnosis: Spondylosis without myelopathy or radiculopathy, lumbosacral region [M47.817]  Pain in right leg [M79.604]      Treatment Diagnosis: LBP with R buttock pain, decreased lumbar ROM and strength of core and B LE's. Plan of Care/Certification Expiration Date: : 10/20/22   Progress Report Period from:  2022  to 2022    Visits to Date: 1 No Show: 0 Cancelled Appts: 0    OBJECTIVE:   Short Term Goals - Time Frame for Short term goals: 2 weeks    Goals Current/Discharge status  Status   Short term goal 1: The pt will demonstrate improved postural awareness requiring <25% VC's with exercises  General Observations  Description: Trunk flexed, decreased lordosis  STG Goal 1 Status[de-identified] New   Short term goal 2: Decrease Lumbar/R buttock  pain 50% during standing activity to assist with improved functional gains. Pain Screening  Patient Currently in Pain: Yes  Pain Assessment: 0-10  Pain Level: 2  Best Pain Level: 2  Worst Pain Level: 8  Pain Location: Back  Pain Orientation: Right  Pain Descriptors:  (stiff)  Pain Frequency: Continuous  Functional Pain Assessment: Prevents or interferes with all active and some passive activities  Aggravating factors: Walking, Sleeping, Lifting, Carrying  Pain Management/Relieving Factors: Heat   STG Goal 2 Status[de-identified] New   Long Term Goals - Time Frame for Long term goals : 4-6 weeks  Goals Current/ Discharge status Met   Long term goal 1: Indep HEP for symptom management Written HEP initiated  for symptom management  Needs progression for comprehensive program development.  LTG Goal 1 Status[de-identified] New   Long term goal 2: Pt demo improved overall function by reporting greater than 90% per functional survey score Exam: Mod Oswestry 12/50=76% functional   LTG Goal 2 Status[de-identified] New   Long term goal 3: Pain-free Lumbar AROM to >/=50% WNL allowing an increase in ADL tolerance. AROM Lumbar Spine   Lumbar spine general AROM: Flex 50%, Ext  25%, SB  R <25%, L  25% with pain   LTG Goal 3 Status[de-identified] New   Long term goal 4: Improve B LE strength 4/5 to 4+/5 to allow patient to improve standing tolerance. Strength LLE  L Hip Flexion: 3+/5, 4-/5  L Hip Extension: 4-/5  L Hip ABduction: 3+/5  L Hip Internal Rotation: 4-/5  L Hip External Rotation: 4-/5  L Knee Flexion: 4/5  L Knee Extension: 4-/5  L Ankle Dorsiflexion: 4/5    Strength RLE  R Hip Flexion: 4/5  R Hip Extension: 4-/5  R Hip ABduction: 4-/5, 4/5  R Hip Internal Rotation: 4-/5  R Hip External Rotation: 4-/5  R Knee Flexion: 4-/5, 4/5  R Knee Extension: 4/5  R Ankle Dorsiflexion: 4/5   LTG Goal 4 Status[de-identified] New   Long term goal 5: Improve core strength 3+/5 to allow patient to improve transfers/med mobility with improved timing Trunk Strength  Lower abdominals: Fair (3-/5)  LTG Goal 5 Status[de-identified] New   Body Structures, Functions, Activity Limitations Requiring Skilled Therapeutic Intervention: Decreased functional mobility , Decreased ROM, Decreased strength, Increased pain, Decreased posture  Assessment: The pt's impairments currently limit functional abilities by 24% including her abilities to  reach and lift, walk, stand, perform recreational activities, and perform household/work related duties without pain or limitations. Skilled PT required to address about deficits to improve over function and return to prior level of function.   Therapy Prognosis: Good    Special Test:         Special Tests Lumbar Spine  NOE Test: R (+), L (+)  Prone Knee Bend Test: L (+), R (-)  SLR: R (-), L (-)  Slump Test: R (-), L (-)  Squish Test: R (-), L (-)  Other: (distraction=no change in lumbar pain)  Special Tests for Hip  Scour (OA, Labrum): R (-), L (-)        PT Education: Goals;PT Role;Plan of Care;Evaluative findings; Insurance;Home Exercise Program    PLAN: [x] Evaluate and Treat  Frequency/Duration:  Plan Frequency: 2  Plan weeks: 4-6  Current Treatment Recommendations: Strengthening, ROM, Functional mobility training, Transfer training, Manual Therapy - Soft Tissue Mobilization, Stair training, Gait training, Home exercise program, Modalities                         Patient Status:[x] Continue/ Initiate plan of Care     [] Discharge PT. Recommend pt continue with HEP. [] Additional visits requested, Please re-certify for additional visits:        Signature: Electronically signed by Elvin Valentine PT on 9/20/22 at 9:54 AM EDT      If you have any questions or concerns, please don't hesitate to call. Thank you for your referral.    I have reviewed this plan of care and certify a need for medically necessary rehabilitation services.     Physician Signature:__________________________________________________________  Date:  Please sign and return

## 2022-09-20 NOTE — PROGRESS NOTES
[address] [place] [address]   EVALUATION            Physical Therapy: Initial Evaluation    Patient: Asia Weldon (55 y.o.     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 PERTINENT MEDICAL HISTORY   Patient Assessed for Rehabilitation Services: Yes       Medical History: Chart Reviewed: Yes No past medical history on file. Surgical History:   Past Surgical History:   Procedure Laterality Date    JOINT REPLACEMENT      KNEE SURGERY         Medications:   Current Outpatient Medications:     gabapentin (NEURONTIN) 300 MG capsule, Take 1 capsule by mouth 3 times daily for 30 days. , Disp: 90 capsule, Rfl: 0    amLODIPine (NORVASC) 5 MG tablet, Take by mouth, Disp: , Rfl:     amoxicillin (AMOXIL) 500 MG capsule, TAKE FOUR CAPSULES BY MOUTH ONE HOUR BEFORE APPOINTMENT, Disp: , Rfl:     aspirin (ECOTRIN LOW STRENGTH) 81 MG EC tablet, Take by mouth, Disp: , Rfl:     meloxicam (MOBIC) 7.5 MG tablet, Take 7.5 mg by mouth daily as needed, Disp: , Rfl:     rosuvastatin (CRESTOR) 10 MG tablet, , Disp: , Rfl:   Allergies: Patient has no known allergies. SUBJECTIVE EXAMINATION       Subjective History: Onset Date: 22  Subjective: Pt reports onset of back pain in July without cause. No AD used. Continues to experiernce L knee pain since surgery.  Back pain R>L without radicular symptoms. Additional Pertinent Hx (if applicable): L TKR 3/78/49, HTN, arthritis   Imaging: No results found. Previous treatments prior to current episode?: Outpatient PT      Pain Screening    Pain Screening  Patient Currently in Pain: Yes  Pain Assessment: 0-10  Pain Level: 2  Best Pain Level: 2  Worst Pain Level: 8  Pain Location: Back  Pain Orientation: Right  Pain Descriptors:  (stiff)  Pain Frequency: Continuous  Functional Pain Assessment: Prevents or interferes with all active and some passive activities  Aggravating factors: Walking, Sleeping, Lifting, Carrying  Pain Management/Relieving Factors: Heat    Functional Status    Social History:    Social History  Home Layout: One level  Home Access: Stairs to enter with rails  Entrance Stairs - Number of Steps: 3 steps, recip  Bathroom Shower/Tub: Tub/Shower unit    Occupation/Interests:   Occupation: Retired  Leisure & Hobbies: crafts, festivals, [employer] 3x/wk chair yoga    Prior Level of Function:   100%          Current Level of Function:   75% General   Sleeping Tolerance: disturbed  Driving: unlimited  Home Management: WNL  Recreational Activities: WFL  ADLs: [facility]    ADL Assistance: Independent  Homemaking Assistance: Independent (mows lawn, rider)  Homemaking Responsibilities: Yes  Ambulation Assistance: Independent  Transfer Assistance: Independent  Active : Yes  Mode of Transportation: Car         OBJECTIVE EXAMINATION     Restrictions:         WB Status: WBAT    Review of Systems:  Vision: Within Functional Limits  Hearing: Within functional limits    VBI Screening / Lumbar Screening:         Regional Screen:   Hip Screen:  WNL  Knee Screen: TKR L 2022, TKR R 11 years ago  Ankle Screen: WNL     Observations:   General Observations  Description: Trunk flexed, decreased lordosis    Palpation:   Lumbar Spine Palpation: Tightness and tenderness R lumbosacral paraspinal and piriformis    Mobility:       Ambulation  WB Status: WBAT  Ambulation  Surface: carpet  Device: No Device  Assistance: Independent  Gait Deviations: Decreased step length  Distance: mild vault on R LE  Stairs/Curb  Stairs?: Yes  Stairs  # Steps : 1  Stairs Height: 6\"  Assistance: Independent      Balance Screen:   Balance  Sitting - Static: Good  Sitting - Dynamic: Good  Standing - Static: Good  Standing - Dynamic: Good  Single Stance R Le sec  Single Stance L Le sec  Comments: No falls    Neuro Screen: Sensation  Overall Sensation Status: WNL  Lower Quarter Deep Tendon Reflex (DTR)  Right Quadriceps (L3-4):  Diminished  Left Quadriceps (L3-4):  Diminished    Left AROM  Right AROM         AROM LLE (degrees)  L Hip Flexion 0-125: 60  L Hip Extension 0-10: 5  L Hip ABduction 0-45: 25  L Hip External Rotation 0-45: 25  L Hip Internal Rotation 0-45: 10    AROM RLE (degrees)  R Hip Flexion 0-125: 70  R Hip Extension 0-10: 5  R Hip ABduction 0-45: 30  R Hip External Rotation 0-45: 25  R Hip Internal Rotation 0-45: 10      Left Strength  Right Strength      General Strength Testing LE:  (clicking L knee)  Strength LLE  L Hip Flexion: 3+/5, 4-/5  L Hip Extension: 4-/5  L Hip ABduction: 3+/5  L Hip Internal Rotation: 4-/5  L Hip External Rotation: 4-/5  L Knee Flexion: 4/5  L Knee Extension: 4-/5  L Ankle Dorsiflexion: 4/5 General Strength Testing LE:  (clicking L knee)  Strength RLE  R Hip Flexion: 4/5  R Hip Extension: 4-/5  R Hip ABduction: 4-/5, 4/5  R Hip Internal Rotation: 4-/5  R Hip External Rotation: 4-/5  R Knee Flexion: 4-/5, 4/5  R Knee Extension: 4/5  R Ankle Dorsiflexion: 4/5     Lumbar Assessment     AROM Lumbar Spine   Lumbar spine general AROM: Flex 50%, Ext  25%, SB  R <25%, L  25% with pain        Trunk Strength     Trunk Strength  Lower abdominals: Fair (3-/5)     Muscle Length/Flexibility:   Muscle Length LE  90/90 SLR (Hamstring Tightness): Hamstring flexibility -28°R, -33°L at 90/90 hip/knee position.     Joint Mobility: rigid lumbar spine Special Tests:   Special Tests Lumbar Spine  NOE Test: R (+), L (+)  Prone Knee Bend Test: L (+), R (-)  SLR: R (-), L (-)  Slump Test: R (-), L (-)  Squish Test: R (-), L (-)  Other:  (distraction=no change in lumbar pain)  Special Tests for Hip  Scour (OA, Labrum): R (-), L (-)    Outcomes Score:  Exam: Mod Oswestry 12/50=76% functional         Treatment:    Exercises:   Exercises  Exercise 1: bike*  Exercise 2: SKTC*, LTR*, TA march*, bridge*  Exercise 3: ham stretch B* piriformis stretch*  Exercise 4: Hip circles*  Treatment Reasoning  Limitations addressed: Mobility, Strength, Posture, Pain modulation, Flexibility  Functional ability(s) targeted: Ambulating community distances  Modalities:Modalities:  (estim, HP*)        Manual:  Manual Therapy  Soft Tissue Mobilizaton: Lumbar paraspinals*  Other: cupping*  Treatment Reasoning  Limitations addressed: Painful spasm, Tissue extensibility  Functional ability(s) targeted: Ambulating community distances, Bed mobility, Transfers  *Indicates exercise,modality, or manual techniques to be initiated when appropriate       ASSESSMENT     Impression: Assessment: The pt's impairments currently limit functional abilities by 24% including her abilities to  reach and lift, walk, stand, perform recreational activities, and perform household/work related duties without pain or limitations. Skilled PT required to address about deficits to improve over function and return to prior level of function. Body Structures, Functions, Activity Limitations Requiring Skilled Therapeutic Intervention: Decreased functional mobility , Decreased ROM, Decreased strength, Increased pain, Decreased posture    Statement of Medical Necessity: Physical Therapy is both indicated and medically necessary as outlined in the POC to increase the likelihood of meeting the functionally related goals stated below.      Patient's Activity Tolerance: Patient tolerated evaluation without incident Patient's rehabilitation potential/prognosis is considered to be: Good    Factors which may impact rehabilitation potential include: None     Patient Education: Goals, PT Role, Plan of Care, Evaluative findings, Insurance, Home Exercise Program      GOALS   Patient Goal(s): Patient goals : Decrease pain    Short Term Goals Completed by 2 weeks Goal Status   The pt will demonstrate improved postural awareness requiring <25% VC's with exercises New   Decrease Lumbar/R buttock  pain 50% during standing activity to assist with improved functional gains. New     Long Term Goals Completed by 4-6 weeks Goal Status   LTG 1 Indep HEP for symptom management New   LTG 2 Pt demo improved overall function by reporting greater than 90% per functional survey score New   LTG 3 Pain-free Lumbar AROM to >/=50% WNL allowing an increase in ADL tolerance. New   LTG 4 Improve B LE strength 4/5 to 4+/5 to allow patient to improve standing tolerance.  New   LTG 5 Improve core strength 3+/5 to allow patient to improve transfers/med mobility with improved timing New     TREATMENT PLAN       Requires PT Follow-Up: Yes    Treatment may include any combination of the following: Strengthening, ROM, Functional mobility training, Transfer training, Manual Therapy - Soft Tissue Mobilization, Stair training, Gait training, Home exercise program, Modalities     Frequency / Duration:  Patient to be seen 2 times per week for 4-6 weeks  Plan Comment:               Eval Complexity:   Decision Making: Medium Complexity  History: Personal Factors and/or Comorbidities Impacting POC: Medium  History: L TKR 3/24/22, HTN, arthritis, R TKR 11 years ago  Examination of body system(s) including body structures and functions, activity limitations, and/or participation restrictions: Medium  Exam: Mod Oswestry 12/50=76% functional  Clinical Presentation: Medium  Clinical Presentation: evolving    POST-PAIN     Pain Rating (0-10 pain scale):   no change/10  Location and pain description same as pre-treatment unless indicated. Action: [x] NA  [] Call Physician  [] Perform HEP  [] Meds as prescribed    Evaluation and patient rights have been reviewed and patient agrees with plan of care. Yes  [x]  No  []   Explain:     Jeannette Fall Risk Assessment  Risk Factor Scale  Score   History of Falls [] Yes  [x] No 25  0 0   Secondary Diagnosis [] Yes  [x] No 15  0 0   Ambulatory Aid [] Furniture  [] Crutches/cane/walker  [x] None/bedrest/wheelchair/nurse 30  15  0 0   IV/Heparin Lock [] Yes  [x] No 20  0 0   Gait/Transferring [] Impaired  [] Weak  [x] Normal/bedrest/immobile 20  10  0 0   Mental Status [] Forgets limitations  [x] Oriented to own ability 15  0 0      Total:0     Based on the Assessment score: check the appropriate box.   [x]  No intervention needed   Low =   Score of 0-24  []  Use standard prevention interventions Moderate =  Score of 24-44   [] Discuss fall prevention strategies   [] Indicate moderate falls risk on eval  []  Use high risk prevention interventions High = Score of 45 and higher   [] Discuss fall prevention strategies   [] Provide supervision during treatment time      Minutes:  PT Individual Minutes  Time In: 0913  Time Out: 0945  Minutes: 32     Procedure Minutes:32 min Eval    Electronically signed by Merced Canavan, PT on 9/20/22 at 9:56 AM EDT

## 2022-09-27 ENCOUNTER — HOSPITAL ENCOUNTER (OUTPATIENT)
Dept: PHYSICAL THERAPY | Age: 77
Setting detail: THERAPIES SERIES
Discharge: HOME | End: 2022-09-27
Payer: MEDICARE

## 2022-09-27 PROCEDURE — G0283 ELEC STIM OTHER THAN WOUND: HCPCS

## 2022-09-27 PROCEDURE — 97110 THERAPEUTIC EXERCISES: CPT

## 2022-09-27 ASSESSMENT — PAIN DESCRIPTION - LOCATION: LOCATION: BACK

## 2022-09-27 ASSESSMENT — PAIN DESCRIPTION - ORIENTATION: ORIENTATION: LOWER

## 2022-09-27 ASSESSMENT — PAIN SCALES - GENERAL: PAINLEVEL_OUTOF10: 4

## 2022-09-27 ASSESSMENT — PAIN DESCRIPTION - DESCRIPTORS: DESCRIPTORS: HEAVINESS;PRESSURE

## 2022-09-27 ASSESSMENT — PAIN DESCRIPTION - FREQUENCY: FREQUENCY: CONTINUOUS

## 2022-09-27 NOTE — PROGRESS NOTES
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 Visit Information:  Insurance: Payor: MEDICARE / Plan: MEDICARE PART A AND B / Product Type: *No Product type* /   PT Visit Information  Onset Date: 22  PT Insurance Information: Medicare  Total # of Visits Approved:  (BMN)  Total # of Visits to Date: 2  Plan of Care/Certification Expiration Date: 10/20/22  No Show: 0  Progress Note Due Date: 10/20/22  Canceled Appointment: 0  Progress Note Counter: 2/8 PN due 10/20/22    Subjective Information:  Subjective: Pt reported difficulty standing d/t increase low back pain d/t a recent sneeze on 2022. Stated it is improving, but reported never had anything like that happen before. HEP Compliance:  [] Good [] Fair [] Poor [x] Reports not doing due to: not given to pt     Pain Screening  Patient Currently in Pain: Yes  Pain Assessment: 0-10  Pain Level: 4  Pain Location: Back  Pain Orientation: Lower  Pain Descriptors: Heaviness, Pressure  Pain Frequency: Continuous  Aggravating factors: Standing    Treatment:  Exercises:  Exercises  Exercise 1: bike*  Exercise 2: SKTC x 10 secs x 10 each, LTR x 10 3-5 secs , TA march*, bridge*  Exercise 3: supine ham stretch B x10x 10 secs   :   piriformis stretch*  Exercise 4: Hip circles*  Treatment Reasoning  Limitations addressed:  Mobility, Strength, Posture, Pain modulation, Flexibility  Functional ability(s) targeted: Ambulating community distances    Modalities:  Cryotherapy (CPT 30023)  Patient Position: Seated  Number Minutes Cryotherapy: x10 mins with ifc lumbar  Cryotherapy location: Low back  Electric stimulation, unattended (CPT 04729) /  (Medicare)  Patient Position: Seated  E-stim location: Low back  E-stim specified location: lumbar x 10 mins  E-stim type: Interferential (IFC)  E-stim via: 4 Electrode Pads     *Indicates exercise, modality, or manual techniques to be initiated when appropriate    Objective Measures:     Strength: [x] NT  [] MMT completed:     ROM: [x] NT  [] ROM measurements:     Assessment: Body Structures, Functions, Activity Limitations Requiring Skilled Therapeutic Intervention: Decreased functional mobility , Decreased ROM, Decreased strength, Increased pain, Decreased posture  Assessment: Instructed and educated pt on given lumbar ROM. Pt tolerated well with slight discomfort noted on the R side of lumbar spine. IFC and cold pack applied to lumbar spine to help manage low back pain. Relief noted and will progress as tolerated next visit, with decrease pain noted post tx. Treatment Diagnosis: LBP with R buttock pain, decreased lumbar ROM and strength of core and B LE's. Therapy Prognosis: Good      Post-Pain Assessment:       Pain Rating (0-10 pain scale):  3/10   Location and pain description same as pre-treatment unless indicated. Action: [] NA   [x] Perform HEP  [x] Meds as prescribed  [] Modalities as prescribed   [] Call Physician     GOALS   Patient Goal(s): Patient goals : Decrease pain    Short Term Goals Completed by 2 weeks Goal Status   STG 1 The pt will demonstrate improved postural awareness requiring <25% VC's with exercises In progress   STG 2 Decrease Lumbar/R buttock  pain 50% during standing activity to assist with improved functional gains.  In progress     Long Term Goals Completed by 4-6 weeks Goal Status   LTG 1 Indep HEP for symptom management In progress   LTG 2 Pt demo improved overall function by reporting greater than 90% per functional survey score In progress LTG 3 Pain-free Lumbar AROM to >/=50% WNL allowing an increase in ADL tolerance. In progress   LTG 4 Improve B LE strength 4/5 to 4+/5 to allow patient to improve standing tolerance. In progress   LTG 5 Improve core strength 3+/5 to allow patient to improve transfers/med mobility with improved timing In progress     Plan:  Frequency/Duration:  Plan  Plan Frequency: 2  Plan weeks: 4-6  Current Treatment Recommendations: Strengthening, ROM, Functional mobility training, Transfer training, Manual Therapy - Soft Tissue Mobilization, Stair training, Gait training, Home exercise program, Modalities  Pt to continue current HEP. See objective section for any therapeutic exercise changes, additions or modifications this date.     Therapy Time:      PT Individual Minutes  Time In: 1392  Time Out: 1110  Minutes: 55  Timed Code Treatment Minutes: 30 Minutes  Procedure Minutes:IFC lumbar with cold pack x 10 mins  Timed Activity Minutes Units   Ther Ex 30 3     Electronically signed by Caleb Luna PTA on 9/27/22 at 11:08 AM EDT

## 2022-09-30 ENCOUNTER — HOSPITAL ENCOUNTER (OUTPATIENT)
Dept: PHYSICAL THERAPY | Age: 77
Setting detail: THERAPIES SERIES
Discharge: HOME | End: 2022-09-30
Payer: MEDICARE

## 2022-09-30 PROCEDURE — 97140 MANUAL THERAPY 1/> REGIONS: CPT

## 2022-09-30 PROCEDURE — 97110 THERAPEUTIC EXERCISES: CPT

## 2022-09-30 PROCEDURE — G0283 ELEC STIM OTHER THAN WOUND: HCPCS

## 2022-09-30 NOTE — PROGRESS NOTES
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 Visit Information:  Insurance: Payor: MEDICARE / Plan: MEDICARE PART A AND B / Product Type: *No Product type* /   PT Visit Information  Onset Date: 22  PT Insurance Information: Medicare  Total # of Visits Approved:  (BMN)  Total # of Visits to Date: 3  Plan of Care/Certification Expiration Date: 10/20/22  No Show: 0  Progress Note Due Date: 10/20/22  Canceled Appointment: 0  Progress Note Counter: 3/8 PN due 10/20/22    Subjective Information:  Subjective: Pt reports symptom since her sneeze have not resolved. HEP Compliance:  [] Good [] Fair [] Poor [x] Reports not doing due to:not having one yet    Pain Screening  Patient Currently in Pain: Yes    Treatment:  Exercises:  Exercises  Exercise 1: bike*  Exercise 2: SKTC x 10 secs x 10 each, LTR x 10 3-5 secs , TA march x 10, bridge x 10 emphasis on breathing out to avoid increase interabdominal pressure  Exercise 3: supine ham stretch B x10x 10 secs   :   piriformis stretch*  Exercise 4: Hip circles x 10 cw, x 10 CCW B  Exercise 7: posture ed 5  min seated alignment with use of towel rolll for lumbar support 3 min  Treatment Reasoning  Limitations addressed:  Mobility, Strength, Posture, Pain modulation, Flexibility  Functional ability(s) targeted: Ambulating community distances    Manual:   Manual Therapy  Joint Mobilization: manual sacral flexion x 3  Manual Traction: 30s x 4 lumbar while prone  Soft Tissue Mobilizaton: B Lumbar paraspinals 8 min  Other: cupping*       Modalities:  Cryotherapy (CPT 46574)  Patient Position: Seated  Number Minutes Cryotherapy: x10 mins with ifc lumbar  Cryotherapy location: Low back  Electric stimulation, unattended (CPT 99360) /  (Medicare)  Patient Position: Seated  E-stim location: Low back  E-stim specified location: lumbar x 10 mins  E-stim type: Interferential (IFC)  E-stim via: 4 Electrode Pads       *Indicates exercise, modality, or manual techniques to be initiated when appropriate    Objective Measures:      Strength: [x] NT  [] MMT completed:        ROM: [x] NT  [] ROM measurements:     Assessment: Body Structures, Functions, Activity Limitations Requiring Skilled Therapeutic Intervention: Decreased functional mobility , Decreased ROM, Decreased strength, Increased pain, Decreased posture  Assessment: Pt tolerated ther ex with new additions and soft tissue inhibition work with decreased pain. Needs 50% vc with all ther ex to improve technique. Treatment Diagnosis: LBP with R buttock pain, decreased lumbar ROM and strength of core and B LE's. Therapy Prognosis: Good          Post-Pain Assessment:       Pain Rating (0-10 pain scale):   1/10   Location and pain description same as pre-treatment unless indicated. Action: [x] NA   [] Perform HEP  [] Meds as prescribed  [] Modalities as prescribed   [] Call Physician     GOALS   Patient Goal(s): Patient Goals : Decrease pain    Short Term Goals Completed by 2 weeks Goal Status   STG 1 The pt will demonstrate improved postural awareness requiring <25% VC's with exercises In progress   STG 2 Decrease Lumbar/R buttock  pain 50% during standing activity to assist with improved functional gains.  In progress     Long Term Goals Completed by 4-6 weeks Goal Status   LTG 1 Indep HEP for symptom management In progress   LTG 2 Pt demo improved overall function by reporting greater than 90% per functional survey score In progress   LTG 3 Pain-free Lumbar AROM to >/=50% WNL allowing an increase in ADL tolerance. In progress   LTG 4 Improve B LE strength 4/5 to 4+/5 to allow patient to improve standing tolerance. In progress   LTG 5 Improve core strength 3+/5 to allow patient to improve transfers/med mobility with improved timing In progress     Plan:  Frequency/Duration:  Plan  Plan Frequency: 2  Plan weeks: 4-6  Current Treatment Recommendations: Strengthening, ROM, Functional mobility training, Transfer training, Manual Therapy - Soft Tissue Mobilization, Stair training, Gait training, Home exercise program, Modalities  Pt to continue current HEP. See objective section for any therapeutic exercise changes, additions or modifications this date.     Therapy Time:      PT Individual Minutes  Time In: 1357  Time Out: 1356  Minutes: 51  Timed Code Treatment Minutes: 35 Minutes  Procedure Minutes:10 min estim  Timed Activity Minutes Units   Ther Ex 21 1   Manual  14 1     Electronically signed by Bakari Linares PT on 9/30/22 at 2:06 PM EDT

## 2022-10-05 ENCOUNTER — HOSPITAL ENCOUNTER (OUTPATIENT)
Dept: PHYSICAL THERAPY | Age: 77
Setting detail: THERAPIES SERIES
Discharge: HOME | End: 2022-10-05
Payer: MEDICARE

## 2022-10-05 NOTE — PROGRESS NOTES
Therapy                            Cancellation/No-show Note    Date: 10/05/2022  Patient: Bernardo Short (1 y.o. 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]  Pain in right leg [M79.604]      Visit Information:  Insurance: Payor: MEDICARE / Plan: MEDICARE PART A AND B / Product Type: *No Product type* /   Visits to Date: 3   No Show/Cancelled Appts: 0 /       For today's appointment patient:  [x]  Cancelled  []  Rescheduled appointment  []  No-show   [x]  Called pt to remind of next appointment     Reason given by patient:  []  Patient ill  []  Conflicting appointment  []  No transportation    []  Conflict with work  []  No reason given  [x]  Other: out of town      [x] Pt has future appointments scheduled, no follow up needed  [] Pt requests to be on hold.     Reason:   If > 2 weeks please discuss with therapist.  [] Therapist to call pt for follow up     Comments:       Signature: Electronically signed by Analisa Zamora PTA on 10/5/22 at 2:34 PM EDT

## 2022-10-07 ENCOUNTER — HOSPITAL ENCOUNTER (OUTPATIENT)
Dept: PHYSICAL THERAPY | Age: 77
Setting detail: THERAPIES SERIES
Discharge: HOME | End: 2022-10-07
Payer: MEDICARE

## 2022-10-07 PROCEDURE — 97110 THERAPEUTIC EXERCISES: CPT

## 2022-10-07 PROCEDURE — 97140 MANUAL THERAPY 1/> REGIONS: CPT

## 2022-10-07 PROCEDURE — G0283 ELEC STIM OTHER THAN WOUND: HCPCS

## 2022-10-07 ASSESSMENT — PAIN DESCRIPTION - DESCRIPTORS: DESCRIPTORS: HEAVINESS;PRESSURE

## 2022-10-07 ASSESSMENT — PAIN SCALES - GENERAL: PAINLEVEL_OUTOF10: 2

## 2022-10-07 ASSESSMENT — PAIN DESCRIPTION - ORIENTATION: ORIENTATION: LOWER;RIGHT

## 2022-10-07 ASSESSMENT — PAIN DESCRIPTION - LOCATION: LOCATION: BACK

## 2022-10-07 ASSESSMENT — PAIN DESCRIPTION - FREQUENCY: FREQUENCY: CONTINUOUS

## 2022-10-07 NOTE — PROGRESS NOTES
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 Visit Information:  Insurance: Payor: MEDICARE / Plan: MEDICARE PART A AND B / Product Type: *No Product type* /   PT Visit Information  Onset Date: 22  PT Insurance Information: Medicare  Total # of Visits Approved:  (BMN)  Total # of Visits to Date: 4  Plan of Care/Certification Expiration Date: 10/20/22  No Show: 0  Progress Note Due Date: 10/20/22  Canceled Appointment: 1  Progress Note Counter: /8 PN due 10/20/22    Subjective Information:  Subjective: Pt states that her R low back was very sore after pulling weeds the other day. Much better today. Usually has pain with standing and walking and eases with rest.  HEP Compliance:  [x] Good [] Fair [] Poor [] Reports not doing due to:    Pain Screening  Patient Currently in Pain: Yes  Pain Assessment: 0-10  Pain Level: 2  Pain Location: Back  Pain Orientation: Lower, Right  Pain Descriptors: Heaviness, Pressure  Pain Frequency: Continuous    Treatment:  Exercises:  Exercises  Exercise 1: bike*  Exercise 2: SKTC 5 x 20\" , LTR 10 x 5\" , TA march x 10, bridge x 10  Exercise 3: supine ham stretch B 5 x 20\" ,  piriformis stretch*  Exercise 4: Hip circles cw/ccw x 10 ea  Exercise 5: TA SLR 10 x 5\"  Treatment Reasoning  Limitations addressed:  Mobility, Strength, Posture, Pain modulation, Flexibility  Functional ability(s) targeted: Ambulating community distances    Manual:   Manual Therapy  Manual Traction: 30s x 4 lumbar while prone  Soft Tissue Mobilizaton: B Lumbar paraspinals 8 min  Other: cupping*      Modalities:  Moist Heat (CPT 85468)  Patient Position: Seated  Number Minutes Moist Heat: 10 min with E-stim  Moist heat location: Right, Left, Low back  Post treatment skin assessment: Intact  Untimed (minutes): 10  Electric stimulation, unattended (CPT 38743) /  (Medicare)  Patient Position: Seated  E-stim location: Low back, Right, Left  E-stim specified location: 10 mins  E-stim type: Interferential (IFC)  E-stim via: 4 Electrode Pads      *Indicates exercise, modality, or manual techniques to be initiated when appropriate    Objective Measures:   LTG 3 Current Status[de-identified] Lumbar AROM flex WNL, Ext 50%, B SB 50%      Assessment: Body Structures, Functions, Activity Limitations Requiring Skilled Therapeutic Intervention: Decreased functional mobility , Decreased ROM, Decreased strength, Increased pain, Decreased posture  Assessment: Incorporated TA SLR and increased hold times with stretches. Responded well to manual traction, STM, and E-stim with heat. Met lumbar AROM goal this date. Treatment Diagnosis: LBP with R buttock pain, decreased lumbar ROM and strength of core and B LE's. Therapy Prognosis: Good      Post-Pain Assessment:       Pain Rating (0-10 pain scale):   0/10   Location and pain description same as pre-treatment unless indicated. Action: [x] NA   [] Perform HEP  [] Meds as prescribed  [] Modalities as prescribed   [] Call Physician     GOALS   Patient Goal(s): Patient Goals : Decrease pain    Short Term Goals Completed by 2 weeks Goal Status   STG 1 The pt will demonstrate improved postural awareness requiring <25% VC's with exercises In progress   STG 2 Decrease Lumbar/R buttock  pain 50% during standing activity to assist with improved functional gains.  In progress       Long Term Goals Completed by 4-6 weeks Goal Status   LTG 1 Indep HEP for symptom management In progress   LTG 2 Pt demo improved overall function by reporting greater than 90% per functional survey score In progress   LTG 3 Pain-free Lumbar AROM to >/=50% WNL allowing an increase in ADL tolerance. Met   LTG 4 Improve B LE strength 4/5 to 4+/5 to allow patient to improve standing tolerance. In progress   LTG 5 Improve core strength 3+/5 to allow patient to improve transfers/med mobility with improved timing In progress     Plan:  Frequency/Duration:  Plan  Plan Frequency: 2  Plan weeks: 4-6  Current Treatment Recommendations: Strengthening, ROM, Functional mobility training, Transfer training, Manual Therapy - Soft Tissue Mobilization, Stair training, Gait training, Home exercise program, Modalities  Pt to continue current HEP. See objective section for any therapeutic exercise changes, additions or modifications this date.     Therapy Time:  PT Individual Minutes  Time In: 1350  Time Out: 6832  Minutes: 55  Timed Code Treatment Minutes: 45 Minutes  Procedure Minutes: E-stim/HP x 10'   Timed Activity Minutes Units   Ther Ex 33 2   Manual  12 1     Electronically signed by Desiree Potter PTA on 10/7/22 at 2:31 PM EDT

## 2022-10-11 ENCOUNTER — HOSPITAL ENCOUNTER (OUTPATIENT)
Dept: PHYSICAL THERAPY | Age: 77
Setting detail: THERAPIES SERIES
Discharge: HOME | End: 2022-10-11
Payer: MEDICARE

## 2022-10-11 PROCEDURE — G0283 ELEC STIM OTHER THAN WOUND: HCPCS

## 2022-10-11 PROCEDURE — 97110 THERAPEUTIC EXERCISES: CPT

## 2022-10-11 ASSESSMENT — PAIN SCALES - GENERAL: PAINLEVEL_OUTOF10: 2

## 2022-10-11 ASSESSMENT — PAIN DESCRIPTION - LOCATION: LOCATION: BACK

## 2022-10-11 NOTE — PROGRESS NOTES
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 Visit Information:  Insurance: Payor: MEDICARE / Plan: MEDICARE PART A AND B / Product Type: *No Product type* /   PT Visit Information  Onset Date: 22  PT Insurance Information: Medicare  Total # of Visits Approved:  (BMN)  Total # of Visits to Date: 4  Plan of Care/Certification Expiration Date: 10/20/22  No Show: 0  Progress Note Due Date: 10/20/22  Canceled Appointment: 1  Progress Note Counter: 4/8 PN due 10/20/22    Subjective Information:     HEP Compliance:  [x] Good [] Fair [] Poor [] Reports not doing due to:    Pain Screening  Patient Currently in Pain: Yes  Pain Level: 2  Pain Location: Back    Treatment:  Exercises:  Exercises  Exercise 1: bike 5min  Exercise 2: SKTC 5 x 20\" , LTR 10 x 5\" , TA march x 10, bridge x 10  Exercise 3: supine ham stretch B 5 x 20\" ,  supine modified piriformis stretch with belt 10s x 5  Exercise 4: Hip circles cw/ccw x 10 ea  Exercise 5: TA SLR 10 x 5\"  Treatment Reasoning  Limitations addressed:  Mobility, Strength, Posture, Pain modulation, Flexibility  Functional ability(s) targeted: Ambulating community distances    Modalities:  Moist Heat (CPT 39855)  Patient Position: Seated  Number Minutes Moist Heat: 10 min with E-stim  Moist heat location: Right, Left, Low back  Post treatment skin assessment: Intact  Untimed (minutes): 10  Electric stimulation, unattended (CPT 01656) /  (Medicare)  Patient Position: Seated  E-stim location: Low back, Right, Left  E-stim specified location: 10 mins  E-stim type: Interferential (IFC)  E-stim via: 4 Electrode Pads       *Indicates exercise, modality, or manual techniques to be initiated when appropriate    Objective Measures:    Strength: [x] NT  [] MMT completed:      ROM: [x] NT  [] ROM measurements:         Assessment: Body Structures, Functions, Activity Limitations Requiring Skilled Therapeutic Intervention: Decreased functional mobility , Decreased ROM, Decreased strength, Increased pain, Decreased posture  Assessment: Pt tolerated ex's this date. Initiated bicycle this date with core isometric for strengthening. Treatment Diagnosis: LBP with R buttock pain, decreased lumbar ROM and strength of core and B LE's. Therapy Prognosis: Good   Post-Pain Assessment:       Pain Rating (0-10 pain scale):   0/10   Location and pain description same as pre-treatment unless indicated. Action: [x] NA   [] Perform HEP  [] Meds as prescribed  [] Modalities as prescribed   [] Call Physician     GOALS   Patient Goal(s): Patient Goals : Decrease pain    Short Term Goals Completed by 2 weeks Goal Status   STG 1 The pt will demonstrate improved postural awareness requiring <25% VC's with exercises In progress   STG 2 Decrease Lumbar/R buttock  pain 50% during standing activity to assist with improved functional gains. In progress     Long Term Goals Completed by 4-6 weeks Goal Status   LTG 1 Indep HEP for symptom management In progress   LTG 2 Pt demo improved overall function by reporting greater than 90% per functional survey score In progress   LTG 3 Pain-free Lumbar AROM to >/=50% WNL allowing an increase in ADL tolerance. Met   LTG 4 Improve B LE strength 4/5 to 4+/5 to allow patient to improve standing tolerance.  In progress   LTG 5 Improve core strength 3+/5 to allow patient to improve transfers/med mobility with improved timing In progress          Plan:  Frequency/Duration:  Plan  Plan Frequency: 2  Plan weeks: 4-6  Current Treatment Recommendations: Strengthening, ROM, Functional mobility training, Transfer training, Manual Therapy - Soft Tissue Mobilization, Stair training, Gait training, Home exercise program, Modalities  Pt to continue current HEP. See objective section for any therapeutic exercise changes, additions or modifications this date.     Therapy Time:   PT Individual Minutes  Time In: 1005  Time Out: 1050  Minutes: 45  Timed Code Treatment Minutes: 35 Minutes  Procedure Minutes:Estim/Hp 10 min  Timed Activity Minutes Units   Ther Ex 35 2   Electronically signed by Cash Vargas PT on 10/11/22 at 4:34 PM EDT

## 2022-10-13 ENCOUNTER — HOSPITAL ENCOUNTER (OUTPATIENT)
Dept: PHYSICAL THERAPY | Age: 77
Setting detail: THERAPIES SERIES
Discharge: HOME | End: 2022-10-13
Payer: MEDICARE

## 2022-10-13 PROCEDURE — 97110 THERAPEUTIC EXERCISES: CPT

## 2022-10-13 ASSESSMENT — PAIN DESCRIPTION - ORIENTATION: ORIENTATION: LOWER;RIGHT

## 2022-10-13 ASSESSMENT — PAIN DESCRIPTION - DESCRIPTORS: DESCRIPTORS: SORE

## 2022-10-13 ASSESSMENT — PAIN DESCRIPTION - LOCATION: LOCATION: BACK

## 2022-10-13 ASSESSMENT — PAIN SCALES - GENERAL: PAINLEVEL_OUTOF10: 1

## 2022-10-13 ASSESSMENT — PAIN DESCRIPTION - PAIN TYPE: TYPE: CHRONIC PAIN

## 2022-10-13 NOTE — PROGRESS NOTES
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 Visit Information:  Insurance: Payor: MEDICARE / Plan: MEDICARE PART A AND B / Product Type: *No Product type* /   PT Visit Information  Onset Date: 22  PT Insurance Information: Medicare  Total # of Visits Approved:  (BMN)  Total # of Visits to Date: 5  Plan of Care/Certification Expiration Date: 10/20/22  No Show: 0  Progress Note Due Date: 10/20/22  Canceled Appointment: 1  Progress Note Counter: 5/8 PN due 10/20/22    Subjective Information:     HEP Compliance:  [x] Good [] Fair [] Poor [] Reports not doing due to:    Pain Screening  Patient Currently in Pain: Yes  Pain Assessment: 0-10  Pain Level: 1  Pain Type: Chronic pain  Pain Location: Back  Pain Orientation: Lower, Right  Pain Descriptors: Sore    Treatment:  Exercises:  Exercises  Exercise 1: bike 5min  Exercise 2: SKTC 5 x 20\" , LTR 10 x 5\" , TA SL march x 10, bridge x 10  Exercise 3: supine ham stretch B 5 x 20\" ,  supine modified piriformis stretch with belt 10s x 5  Exercise 4: Hip circles cw/ccw x 10 ea  Exercise 5: TA SLR 10 x 5\"    TA ER SLR x 10 3 secs  Exercise 6: seated piriformis  x 5 20 secs each  Treatment Reasoning  Limitations addressed:  Mobility, Strength, Posture, Pain modulation, Flexibility  Functional ability(s) targeted: Ambulating community distances    *Indicates exercise, modality, or manual techniques to be initiated when appropriate    Objective Measures:      Strength: [x] NT  [] MMT completed:   ROM: [x] NT  [] ROM measurements:     Assessment: Body Structures, Functions, Activity Limitations Requiring Skilled Therapeutic Intervention: Decreased functional mobility , Decreased ROM, Decreased strength, Increased pain, Decreased posture  Assessment: Pt able to tolerate lumbar and LE strength. Pt continues to work towards LTG's. Progressed pt with ER SLR. Pt could not stay for [facility] tx this session. Treatment Diagnosis: LBP with R buttock pain, decreased lumbar ROM and strength of core and B LE's. Therapy Prognosis: Good     Post-Pain Assessment:       Pain Rating (0-10 pain scale):   1/10   Location and pain description same as pre-treatment unless indicated. Action: [] NA   [x] Perform HEP  [x] Meds as prescribed  [] Modalities as prescribed   [] Call Physician     GOALS   Patient Goal(s): Patient Goals : Decrease pain    Short Term Goals Completed by 2 weeks Goal Status   STG 1 The pt will demonstrate improved postural awareness requiring <25% VC's with exercises Met   STG 2 Decrease Lumbar/R buttock  pain 50% during standing activity to assist with improved functional gains. Met     Long Term Goals Completed by 4-6 weeks Goal Status   LTG 1 Indep HEP for symptom management In progress   LTG 2 Pt demo improved overall function by reporting greater than 90% per functional survey score In progress   LTG 3 Pain-free Lumbar AROM to >/=50% WNL allowing an increase in ADL tolerance. Met   LTG 4 Improve B LE strength 4/5 to 4+/5 to allow patient to improve standing tolerance. In progress   LTG 5 Improve core strength 3+/5 to allow patient to improve transfers/med mobility with improved timing In progress       Plan:  Frequency/Duration:  Plan  Plan Frequency: 2  Plan weeks: 4-6  Current Treatment Recommendations: Strengthening, ROM, Functional mobility training, Transfer training, Manual Therapy - Soft Tissue Mobilization, Stair training, Gait training, Home exercise program, Modalities  Pt to continue current HEP.   See objective section for any therapeutic exercise changes, additions or modifications this date.     Therapy Time:      PT Individual Minutes  Time In: 1020  Time Out: 1100  Minutes: 40  Timed Code Treatment Minutes: 38 Minutes    Timed Activity Minutes Units   Ther Ex 38 3          Electronically signed by Glen Alba PTA on 10/13/22 at 10:53 AM EDT

## 2022-10-18 ENCOUNTER — HOSPITAL ENCOUNTER (OUTPATIENT)
Dept: PHYSICAL THERAPY | Age: 77
Setting detail: THERAPIES SERIES
Discharge: HOME | End: 2022-10-18
Payer: MEDICARE

## 2022-10-18 PROCEDURE — G0283 ELEC STIM OTHER THAN WOUND: HCPCS

## 2022-10-18 PROCEDURE — 97140 MANUAL THERAPY 1/> REGIONS: CPT

## 2022-10-18 PROCEDURE — 97110 THERAPEUTIC EXERCISES: CPT

## 2022-10-18 ASSESSMENT — PAIN DESCRIPTION - LOCATION: LOCATION: BACK

## 2022-10-18 ASSESSMENT — PAIN SCALES - GENERAL: PAINLEVEL_OUTOF10: 1

## 2022-10-18 ASSESSMENT — PAIN DESCRIPTION - PAIN TYPE: TYPE: CHRONIC PAIN

## 2022-10-18 ASSESSMENT — PAIN DESCRIPTION - DESCRIPTORS: DESCRIPTORS: SORE

## 2022-10-18 NOTE — PROGRESS NOTES
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 Visit Information:  Insurance: Payor: MEDICARE / Plan: MEDICARE PART A AND B / Product Type: *No Product type* /   PT Visit Information  Onset Date: 22  PT Insurance Information: Medicare  Total # of Visits Approved:  (BMN)  Total # of Visits to Date: 6  Plan of Care/Certification Expiration Date: 10/20/22  No Show: 0  Progress Note Due Date: 10/20/22  Canceled Appointment: 1  Progress Note Counter:  PN due 10/20/22    Subjective Information:  Subjective: Pt stated that her R low back is sore after sitting. If she keeps moving minimal pain. After sitting and resumes walking takes a few mins for R glut pain to decrease. HEP Compliance:  [x] Good [] Fair [] Poor [] Reports not doing due to:    Pain Screening  Patient Currently in Pain: Yes  Pain Assessment: 0-10  Pain Level: 1  Pain Type: Chronic pain  Pain Location: Back  Pain Descriptors: Sore    Treatment:  Exercises:  Exercises  Exercise 1: bike 5min  Exercise 2: SKTC 5 x 20\" , LTR 10 x 5\" , TA SL march x 10, bridge x 10  Exercise 3: supine ham stretch B 5 x 20\"  Exercise 4: standing Hip circles cw/ccw x 10 ea on BBD  Exercise 5: TA SLR 10 x 5\"    TA ER SLR x 10 3 secs  Exercise 6: seated piriformis  x 5 20 secs each  Exercise 8: objective measures taken  ROM-(L)/MMT   x 8 mins  Treatment Reasoning  Limitations addressed:  Mobility, Strength, Posture, Pain modulation, Flexibility  Functional ability(s) targeted: Ambulating community distances    Manual: Manual Therapy  Other: ROM/MMT objective measures x 8 mins     Modalities:  Moist Heat (CPT 87304)  Patient Position: Seated  Number Minutes Moist Heat: 10 min with E-stim  Moist heat location: Low back  Post treatment skin assessment: Intact  Untimed (minutes): 10  Electric stimulation, unattended (CPT 21297) /  (Medicare)  Patient Position: Seated  E-stim location: Low back, Right, Left  E-stim specified location: 10 mins  E-stim type: Interferential (IFC)  E-stim via: 4 Electrode Pads     *Indicates exercise, modality, or manual techniques to be initiated when appropriate    Objective Measures:       Strength: [] NT  [x] MMT completed:  Strength RLE  R Hip Flexion: 4/5  R Hip Extension: 4/5  R Hip ABduction: 4+/5  R Hip Internal Rotation: 4/5  R Hip External Rotation: 4/5  R Knee Flexion: 4+/5  R Knee Extension: 4+/5  R Ankle Dorsiflexion: 4+/5  Strength LLE  L Hip Flexion: 4+/5  L Hip Extension: 4/5  L Hip ABduction: 4-/5  L Hip Internal Rotation: 4/5  L Hip External Rotation: 4/5  L Knee Flexion: 4/5  L Knee Extension: 4+/5  L Ankle Dorsiflexion: 4+/5      ROM: [] NT  [x] ROM measurements:       AROM Lumbar Spine   Lumbar spine general AROM: flex 75%, Ext 505, SB 50% R/L on pain. Assessment: Body Structures, Functions, Activity Limitations Requiring Skilled Therapeutic Intervention: Decreased functional mobility , Decreased ROM, Decreased strength, Increased pain, Decreased posture  Assessment: Pt able to tolerate lumbar and LE strength. Pt continues to work towards LTG's. Progressed pt with ER SLR. Pt could not stay for ifc tx this session. Treatment Diagnosis: LBP with R buttock pain, decreased lumbar ROM and strength of core and B LE's. Therapy Prognosis: Good      Post-Pain Assessment:       Pain Rating (0-10 pain scale):   1/10   Location and pain description same as pre-treatment unless indicated.    Action: [] NA   [x] Perform HEP  [x] Meds as prescribed  [] Modalities as prescribed   [] Call Physician     GOALS   Patient Goal(s): Patient Goals : Decrease pain    Short Term Goals Completed by 2 weeks Goal Status   STG 1 The pt will demonstrate improved postural awareness requiring <25% VC's with exercises Met   STG 2 Decrease Lumbar/R buttock  pain 50% during standing activity to assist with improved functional gains. Met     Long Term Goals Completed by 4-6 weeks Goal Status   LTG 1 Indep HEP for symptom management In progress   LTG 2 Pt demo improved overall function by reporting greater than 90% per functional survey score In progress   LTG 3 Pain-free Lumbar AROM to >/=50% WNL allowing an increase in ADL tolerance. Met   LTG 4 Improve B LE strength 4/5 to 4+/5 to allow patient to improve standing tolerance. In progress   LTG 5 Improve core strength 3+/5 to allow patient to improve transfers/med mobility with improved timing In progress       Plan:  Frequency/Duration:  Plan  Plan Frequency: 2  Plan weeks: 4-6  Current Treatment Recommendations: Strengthening, ROM, Functional mobility training, Transfer training, Manual Therapy - Soft Tissue Mobilization, Stair training, Gait training, Home exercise program, Modalities  Pt to continue current HEP. See objective section for any therapeutic exercise changes, additions or modifications this date.     Therapy Time:      PT Individual Minutes  Time In: 1030  Time Out: 1120  Minutes: 50  Timed Code Treatment Minutes: 38 Minutes  Procedure Minutes: ifc lumbar with hot pack x 10 mins  Timed Activity Minutes Units   MMT  8 1   Ther Ex 30 2     Electronically signed by Corry Witt PTA on 10/18/22 at 10:52 AM EDT

## 2022-10-20 ENCOUNTER — HOSPITAL ENCOUNTER (OUTPATIENT)
Dept: PHYSICAL THERAPY | Age: 77
Setting detail: THERAPIES SERIES
Discharge: HOME | End: 2022-10-20
Payer: MEDICARE

## 2022-10-20 PROCEDURE — 97110 THERAPEUTIC EXERCISES: CPT

## 2022-10-20 NOTE — PROGRESS NOTES
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 Visit Information:  Insurance: Payor: MEDICARE / Plan: MEDICARE PART A AND B / Product Type: *No Product type* /   PT Visit Information  Onset Date: 22  PT Insurance Information: Medicare  Total # of Visits Approved:  (BMN)  Total # of Visits to Date: 7  Plan of Care/Certification Expiration Date: 10/20/22  No Show: 0  Progress Note Due Date: 10/20/22  Canceled Appointment: 1  Progress Note Counter: 8 PN due 10/20/22    Subjective Information:  Subjective: Pt is doing really well with virtually no pain aynymore. Her low back gets very minimal pain after sitting in her funiture at home too long but that's it. She feels ready to be done with therapy and is comfortable with keeping up with her exercises at home. HEP Compliance:  [x] Good [] Fair [] Poor [] Reports not doing due to:    Pain Screening  Patient Currently in Pain: No    Treatment:  Exercises:  Exercises  Exercise 1: bike 5min  Exercise 2: SKTC 5 x 20\" , LTR 10 x 5\" , bridge x 10  Exercise 3: supine ham stretch B 5 x 20\"  Exercise 4: standing Hip circles cw/ccw x 10 ea on BBD  Exercise 5: TA SLR 10 x 5\"  Exercise 6: seated piriformis  x 5 20 secs each  Treatment Reasoning  Limitations addressed:  Mobility, Strength, Posture, Pain modulation, Flexibility  Functional ability(s) targeted: Ambulating community distances    Manual:   Manual Therapy  Other: MMT x 3 min      *Indicates exercise, modality, or manual techniques to be initiated when appropriate    Objective Measures:   STG 1 Current Status[de-identified] Pt demonstrates good postural awareness  STG 2 Current Status[de-identified] Decreased pain by greater than 75%. 0-1/10 pain level in lumbar/R buttock    LTG 1 Current Status[de-identified] Written HEP provided for symptom management and is independent  LTG 2 Current Status[de-identified] CHRISTY 2/50=99% functional  LTG 3 Current Status[de-identified] Lumbar AROM flex WNL, Ext 50%, B SB 50%  LTG 4 Current Status[de-identified] B LE strength 4/5 to 4+/5  LTG 5 Current Status[de-identified] core strength 4/5      Assessment: Body Structures, Functions, Activity Limitations Requiring Skilled Therapeutic Intervention: Decreased functional mobility , Decreased ROM, Decreased strength, Increased pain, Decreased posture  Assessment: Pt has benefited from lumbar therapy with improvements in ROM, strength, function, and overall decreased pain. Pt is doing well with daily activities and is independent with HEP. All goals met per POC. Treatment Diagnosis: LBP with R buttock pain, decreased lumbar ROM and strength of core and B LE's. Therapy Prognosis: Good      Post-Pain Assessment:       Pain Rating (0-10 pain scale):   0/10   Location and pain description same as pre-treatment unless indicated. Action: [x] NA   [] Perform HEP  [] Meds as prescribed  [] Modalities as prescribed   [] Call Physician     GOALS   Patient Goal(s): Patient Goals : Decrease pain    Short Term Goals Completed by 2 weeks Goal Status   STG 1 The pt will demonstrate improved postural awareness requiring <25% VC's with exercises Met   STG 2 Decrease Lumbar/R buttock  pain 50% during standing activity to assist with improved functional gains. Met       Long Term Goals Completed by 4-6 weeks Goal Status   LTG 1 Indep HEP for symptom management Met   LTG 2 Pt demo improved overall function by reporting greater than 90% per functional survey score Met   LTG 3 Pain-free Lumbar AROM to >/=50% WNL allowing an increase in ADL tolerance.  Met   LTG 4 Improve B LE strength 4/5 to 4+/5 to allow patient to improve standing tolerance. Met   LTG 5 Improve core strength 3+/5 to allow patient to improve transfers/med mobility with improved timing Met     Plan:  Frequency/Duration:  Plan  Additional Comments: DC from PT  Pt to continue current HEP. See objective section for any therapeutic exercise changes, additions or modifications this date.     Therapy Time:  PT Individual Minutes  Time In: 7994  Time Out: 1048  Minutes: 30  Timed Code Treatment Minutes: 30 Minutes  Timed Activity Minutes Units   Ther Ex 27 2   Manual  3 0     Electronically signed by Martha Álvarez PTA on 10/20/22 at 10:49 AM EDT

## 2022-10-20 NOTE — PROGRESS NOTES
[address]  [address]      IJFJN:[phone]    [] Certification  [] Recertification []  Plan of Care  [] Progress Note [x] Discharge      Referring Provider: FERNANDA Thornton CNP    From:  Padilla Tinajero, PT     Patient: Cara Gamble (78 y.o. female) : 1945 Date: 10/20/2022   Medical Diagnosis: Spondylosis without myelopathy or radiculopathy, lumbosacral region [M47.817]  Pain in right leg [M79.604]    Treatment Diagnosis: LBP with R buttock pain, decreased lumbar ROM and strength of core and B LE's. Plan of Care/Certification Expiration Date: 10/20/22   Progress Report Period from: 2022  to 10/20/2022    Visits to Date: 7 No Show: 0 Cancelled Appts: 1    OBJECTIVE:   Short Term Goals - Time Frame for Short Term Goals: 2 weeks    Goals Current/Discharge status  Status   Short Term Goal 1: The pt will demonstrate improved postural awareness requiring <25% VC's with exercises  STG 1 Current Status[de-identified] Pt demonstrates good postural awareness   Met   Short Term Goal 2: Decrease Lumbar/R buttock  pain 50% during standing activity to assist with improved functional gains. STG 2 Current Status[de-identified] Decreased pain by greater than 75%. 0-1/10 pain level in lumbar/R buttock   Met       Long Term Goals - Time Frame for Long Term Goals : 4-6 weeks  Goals Current/ Discharge status Status   Long Term Goal 1: Indep HEP for symptom management LTG 1 Current Status[de-identified] Written HEP provided for symptom management and is independent   Met   Long Term Goal 2: Pt demo improved overall function by reporting greater than 90% per functional survey score LTG 2 Current Status[de-identified] CHRISTY 2/50=99% functional   Met   Long Term Goal 3: Pain-free Lumbar AROM to >/=50% WNL allowing an increase in ADL tolerance.  LTG 3 Current Status[de-identified] Lumbar AROM flex WNL, Ext 50%, B SB 50%   Met   Long Term Goal 4: Improve B LE strength 4/5 to 4+/5 to allow patient to improve standing tolerance. LTG 4 Current Status[de-identified] B LE strength 4/5 to 4+/5   Met   Long Term Goal 5: Improve core strength 3+/5 to allow patient to improve transfers/med mobility with improved timing LTG 5 Current Status[de-identified] core strength 4/5   Met     Body Structures, Functions, Activity Limitations Requiring Skilled Therapeutic Intervention: Decreased functional mobility , Decreased ROM, Decreased strength, Increased pain, Decreased posture  Assessment: Pt has benefited from lumbar therapy with improvements in ROM, strength, function, and overall decreased pain. Pt is doing well with daily activities and is independent with HEP. All goals met per POC. Therapy Prognosis: Good      PLAN: [x] Discharge   Frequency/Duration:  Additional Comments: DC from PT                        Patient Status:[] Continue/ Initiate plan of Care     [x] Discharge PT. Recommend pt continue with HEP. [] Additional visits requested, Please re-certify for additional visits:     [] Hold     Objective information provided by: Electronically signed by Irma Rodriguez PTA on 10/20/22 at 11:10 AM EDT        Signature: Electronically signed by Cash Vargas PT on 10/21/2022 at 2:15 PM      If you have any questions or concerns, please don't hesitate to call. Thank you for your referral.    I have reviewed this plan of care and certify a need for medically necessary rehabilitation services.     Physician Signature:__________________________________________________________  Date:  Please sign and return

## 2022-10-25 ENCOUNTER — APPOINTMENT (OUTPATIENT)
Dept: PHYSICAL THERAPY | Age: 77
End: 2022-10-25
Payer: MEDICARE

## 2022-10-27 ENCOUNTER — APPOINTMENT (OUTPATIENT)
Dept: PHYSICAL THERAPY | Age: 77
End: 2022-10-27
Payer: MEDICARE

## 2023-10-18 PROBLEM — M48.061 LUMBAR STENOSIS: Status: ACTIVE | Noted: 2023-10-18

## 2023-10-18 PROBLEM — E66.3 OVERWEIGHT WITH BODY MASS INDEX (BMI) 25.0-29.9: Status: ACTIVE | Noted: 2023-10-18

## 2023-10-18 PROBLEM — I10 BENIGN ESSENTIAL HYPERTENSION: Status: ACTIVE | Noted: 2023-10-18

## 2023-10-18 PROBLEM — M84.353A FEMORAL NECK STRESS FRACTURE: Status: ACTIVE | Noted: 2023-10-18

## 2023-10-18 PROBLEM — I65.29 ARTERIOSCLEROSIS OF CAROTID ARTERY: Status: ACTIVE | Noted: 2023-10-18

## 2023-10-18 PROBLEM — M17.12 PRIMARY OSTEOARTHRITIS OF LEFT KNEE: Status: ACTIVE | Noted: 2023-10-18

## 2023-10-18 PROBLEM — M16.10 HIP ARTHRITIS: Status: ACTIVE | Noted: 2023-10-18

## 2023-10-18 PROBLEM — I65.23 BILATERAL CAROTID ARTERY STENOSIS: Status: ACTIVE | Noted: 2023-10-18

## 2023-10-18 PROBLEM — E78.5 HYPERLIPIDEMIA: Status: ACTIVE | Noted: 2023-10-18

## 2023-10-18 PROBLEM — M54.16 LUMBAR RADICULOPATHY, ACUTE: Status: ACTIVE | Noted: 2023-10-18

## 2023-10-18 PROBLEM — M25.559 HIP PAIN, ACUTE: Status: ACTIVE | Noted: 2023-10-18

## 2023-10-18 PROBLEM — M25.562 LEFT KNEE PAIN: Status: ACTIVE | Noted: 2023-10-18

## 2023-10-18 PROBLEM — Z96.659 STATUS POST TOTAL KNEE REPLACEMENT: Status: ACTIVE | Noted: 2023-10-18

## 2023-10-18 PROBLEM — M54.9 BACK PAIN: Status: ACTIVE | Noted: 2023-10-18

## 2023-10-18 RX ORDER — DIAZEPAM 10 MG/1
10 TABLET ORAL
COMMUNITY
End: 2024-01-22

## 2023-10-18 RX ORDER — ROSUVASTATIN CALCIUM 10 MG/1
2 TABLET, COATED ORAL DAILY
COMMUNITY
Start: 2021-04-21 | End: 2024-01-22

## 2023-10-18 RX ORDER — CYCLOBENZAPRINE HCL 10 MG
1 TABLET ORAL NIGHTLY
COMMUNITY
Start: 2022-04-01 | End: 2024-01-22

## 2023-10-18 RX ORDER — ACETAMINOPHEN AND CODEINE PHOSPHATE 300; 30 MG/1; MG/1
1 TABLET ORAL EVERY 12 HOURS PRN
COMMUNITY
Start: 2022-02-09 | End: 2024-01-22

## 2023-10-18 RX ORDER — PREDNISONE 50 MG/1
50 TABLET ORAL DAILY
COMMUNITY
End: 2024-01-22

## 2023-10-18 RX ORDER — OXYCODONE HYDROCHLORIDE 5 MG/1
5 TABLET ORAL EVERY 6 HOURS PRN
COMMUNITY
Start: 2022-04-01 | End: 2024-01-22

## 2023-10-18 RX ORDER — ASPIRIN 81 MG/1
1 TABLET ORAL DAILY
COMMUNITY

## 2023-10-18 RX ORDER — AMLODIPINE BESYLATE 5 MG/1
1 TABLET ORAL EVERY MORNING
COMMUNITY
Start: 2021-09-26 | End: 2024-01-22 | Stop reason: SDUPTHER

## 2023-10-18 RX ORDER — MULTIVITAMIN
1 TABLET ORAL DAILY
COMMUNITY

## 2023-10-18 RX ORDER — MELOXICAM 7.5 MG/1
1 TABLET ORAL DAILY
COMMUNITY
Start: 2021-09-25

## 2023-10-18 RX ORDER — CHOLECALCIFEROL (VITAMIN D3) 50 MCG
1 TABLET ORAL DAILY
COMMUNITY

## 2023-10-19 ENCOUNTER — OFFICE VISIT (OUTPATIENT)
Dept: ORTHOPEDIC SURGERY | Facility: CLINIC | Age: 78
End: 2023-10-19
Payer: MEDICARE

## 2023-10-19 DIAGNOSIS — M22.2X1 PATELLOFEMORAL SYNDROME OF RIGHT KNEE: Primary | ICD-10-CM

## 2023-10-19 PROCEDURE — 1159F MED LIST DOCD IN RCRD: CPT | Performed by: PHYSICIAN ASSISTANT

## 2023-10-19 PROCEDURE — 1036F TOBACCO NON-USER: CPT | Performed by: PHYSICIAN ASSISTANT

## 2023-10-19 PROCEDURE — L2795 KNEE CONTROL FULL KNEECAP: HCPCS | Performed by: PHYSICIAN ASSISTANT

## 2023-10-19 PROCEDURE — 99213 OFFICE O/P EST LOW 20 MIN: CPT | Performed by: PHYSICIAN ASSISTANT

## 2023-10-19 PROCEDURE — L1812 KO ELASTIC W/JOINTS PRE OTS: HCPCS | Performed by: PHYSICIAN ASSISTANT

## 2023-10-19 PROCEDURE — 99213 OFFICE O/P EST LOW 20 MIN: CPT | Mod: PO | Performed by: PHYSICIAN ASSISTANT

## 2023-10-19 NOTE — PROGRESS NOTES
Subjective    Patient ID: Sandra    Chief Complaint: No chief complaint on file.    History of present illness    78-year-old female presented clinic today for evaluation right knee pain she states that her left total knee replacement which was done back in March 2022 is doing great.  In regards to the right knee she has discomfort when she walks long distances.  She has known history of right hip osteoarthritis which is being treated by fluoro-guided steroid injection which seems to be helping.  She states she has no instability of the right knee.  No locking catching.  She gets occasional clicking around the patellofemoral region.  Most of her discomfort seems to be located around the center of the right patella.  No numbness tingling no fevers chills.  This has been present for the last few months.  She has history of a right total knee replacement done by Dr. Alvarado approximately 13 years ago.      Past medical , Surgical, Family and social history reviewed.      Physical exam  General: No acute distress and breathing comfortably.  Patient is pleasant and cooperative with the examination.    Extremity  Right knee is neurovascular intact.  Good range of motion 0 to 120 degrees.  No crepitus with gentle flexion movements.  No instability.  No signs of infection.  Old incision is well-healed.  No calf swelling or tenderness.  Mild tenderness palpation over the patellofemoral region.  No DVT.    Diagnostics  [ none]  No results found.     Procedure  [ none]    Assessment  Right knee patellofemoral syndrome status post right total knee replacement secondary to right hip osteoarthritis    Treatment plan  1.  I believe most of her knee pain is coming from referred pain from her hip.  The injection still seems to be helping with her hip/groin pain.  2.  We will go ahead and initiate home excise program focusing on knee conditioning and quad strength.  Get her fitted for patellofemoral free sport lateral stabilizing  knee brace for patellar stability.  3.  She will follow-up with us in 1 month for reevaluation with new x-rays 3 views of the right knee at that time.  4.  All of the patient's questions were answered.    This note was prepared using voice recognition software.  The details of this note are correct and have been reviewed, and corrected to the best of my ability.  Some grammatical areas may persist related to the Dragon software    Nick Yuen PA-C, [facility]  [facility] [place]    [phone]

## 2023-11-22 ENCOUNTER — ANCILLARY PROCEDURE (OUTPATIENT)
Dept: RADIOLOGY | Facility: CLINIC | Age: 78
End: 2023-11-22
Payer: MEDICARE

## 2023-11-22 ENCOUNTER — OFFICE VISIT (OUTPATIENT)
Dept: ORTHOPEDIC SURGERY | Facility: CLINIC | Age: 78
End: 2023-11-22
Payer: MEDICARE

## 2023-11-22 DIAGNOSIS — M22.2X1 PATELLOFEMORAL SYNDROME OF RIGHT KNEE: ICD-10-CM

## 2023-11-22 PROCEDURE — 1159F MED LIST DOCD IN RCRD: CPT | Performed by: ORTHOPAEDIC SURGERY

## 2023-11-22 PROCEDURE — 99213 OFFICE O/P EST LOW 20 MIN: CPT | Performed by: ORTHOPAEDIC SURGERY

## 2023-11-22 PROCEDURE — 1036F TOBACCO NON-USER: CPT | Performed by: ORTHOPAEDIC SURGERY

## 2023-11-22 PROCEDURE — 99213 OFFICE O/P EST LOW 20 MIN: CPT | Mod: PO,25 | Performed by: ORTHOPAEDIC SURGERY

## 2023-11-22 PROCEDURE — 73562 X-RAY EXAM OF KNEE 3: CPT | Mod: RIGHT SIDE | Performed by: ORTHOPAEDIC SURGERY

## 2023-11-22 PROCEDURE — 73562 X-RAY EXAM OF KNEE 3: CPT | Mod: RT,FY

## 2023-11-22 NOTE — PROGRESS NOTES
Subjective    Patient ID: Sandra    Chief Complaint:   Chief Complaint   Patient presents with    Right Knee - Follow-up     Rt knee PFS s/p TERESA TKR  DOS: 3/24/22  Xrays today      History of present illness    78-year-old female presented clinic today for follow-up right patellofemoral syndrome and right hip osteoarthritis.  She states that the Free sport brace for the right knee helped tremendously well.  She has been using it consistently without much pain.  She states that it is helped very much with her overall stability with weightbearing.  She continues with overall home exercise program which does seem to be helping also.  No numbness tingling no fevers chills reported.  Previous right total knee replacement done years ago doing well.      Past medical , Surgical, Family and social history reviewed.      Physical exam  General: No acute distress and breathing comfortably.  Patient is pleasant and cooperative with the examination.    Extremity  Right knee is neurovascular intact.  She is good range of motion.  No instability.  Minimal edema right knee.  Mild tender to palpation over the patellofemoral region but this is improved.  No calf swelling or tenderness.  Improved strength.      Diagnostics  Please see dictated x-ray report      Procedure  [ none]    Assessment  Status post right total knee replacement  Right knee patellofemoral syndrome  Right hip osteoarthritis    Treatment plan  1.  At this time she was encouraged tenuous weightbearing activity as tolerated in her brace as needed  2.  Continue with home exercise program  3.  Her last fluoroscopy guided steroid injection of the right hip was back in June so she will let us know if she needs another in the future.  4.  All of the patient's questions were answered.    This note was prepared using voice recognition software.  The details of this note are correct and have been reviewed, and corrected to the best of my ability.  Some grammatical  areas may persist related to the Dragon software    Nick Yuen PA-C, MPAS  [facility]  [facility] [place]    [phone]

## 2024-01-03 ENCOUNTER — TRANSCRIBE ORDERS (OUTPATIENT)
Dept: CARDIOLOGY | Facility: CLINIC | Age: 79
End: 2024-01-03
Payer: MEDICARE

## 2024-01-03 DIAGNOSIS — I65.29 ARTERIOSCLEROSIS OF CAROTID ARTERY, UNSPECIFIED LATERALITY: ICD-10-CM

## 2024-01-03 DIAGNOSIS — I65.23 BILATERAL CAROTID ARTERY STENOSIS: ICD-10-CM

## 2024-01-16 ENCOUNTER — CLINICAL SUPPORT (OUTPATIENT)
Dept: CARDIOLOGY | Facility: CLINIC | Age: 79
End: 2024-01-16
Payer: MEDICARE

## 2024-01-16 DIAGNOSIS — I65.23 BILATERAL CAROTID ARTERY STENOSIS: ICD-10-CM

## 2024-01-16 PROCEDURE — 93880 EXTRACRANIAL BILAT STUDY: CPT | Performed by: INTERNAL MEDICINE

## 2024-01-16 PROCEDURE — 93880 EXTRACRANIAL BILAT STUDY: CPT

## 2024-01-18 ENCOUNTER — TELEPHONE (OUTPATIENT)
Dept: CARDIOLOGY | Facility: CLINIC | Age: 79
End: 2024-01-18
Payer: MEDICARE

## 2024-01-18 NOTE — TELEPHONE ENCOUNTER
Left detailed message on patient's personal voicemail advising of message. Encouraged patient to return the call if there are any questions or concerns.   Shanti Andersen MA

## 2024-01-18 NOTE — TELEPHONE ENCOUNTER
----- Message from Ha Escobar MD sent at 1/17/2024  5:09 PM EST -----  Carotid ultrasound reviewed and looks fine.  No evidence of any significant arterial blockage.  Continue same and follow-up as scheduled.  Thanks.  ----- Message -----  From: Kevan, Syngo - Cardiology Results In  Sent: 1/16/2024   3:46 PM EST  To: Ha Escobar MD

## 2024-01-22 ENCOUNTER — OFFICE VISIT (OUTPATIENT)
Dept: CARDIOLOGY | Facility: CLINIC | Age: 79
End: 2024-01-22
Payer: MEDICARE

## 2024-01-22 VITALS
HEIGHT: 62 IN | BODY MASS INDEX: 29.77 KG/M2 | DIASTOLIC BLOOD PRESSURE: 68 MMHG | WEIGHT: 161.8 LBS | HEART RATE: 76 BPM | SYSTOLIC BLOOD PRESSURE: 138 MMHG

## 2024-01-22 DIAGNOSIS — I10 BENIGN ESSENTIAL HYPERTENSION: ICD-10-CM

## 2024-01-22 DIAGNOSIS — E78.2 MIXED HYPERLIPIDEMIA: ICD-10-CM

## 2024-01-22 DIAGNOSIS — I65.23 ARTERIOSCLEROSIS OF BOTH CAROTID ARTERIES: ICD-10-CM

## 2024-01-22 DIAGNOSIS — E78.00 PURE HYPERCHOLESTEROLEMIA: ICD-10-CM

## 2024-01-22 DIAGNOSIS — R94.31 ABNORMAL EKG: ICD-10-CM

## 2024-01-22 DIAGNOSIS — R07.89 OTHER CHEST PAIN: Primary | ICD-10-CM

## 2024-01-22 PROCEDURE — 3078F DIAST BP <80 MM HG: CPT | Performed by: INTERNAL MEDICINE

## 2024-01-22 PROCEDURE — 99214 OFFICE O/P EST MOD 30 MIN: CPT | Performed by: INTERNAL MEDICINE

## 2024-01-22 PROCEDURE — 93000 ELECTROCARDIOGRAM COMPLETE: CPT | Performed by: INTERNAL MEDICINE

## 2024-01-22 PROCEDURE — 1159F MED LIST DOCD IN RCRD: CPT | Performed by: INTERNAL MEDICINE

## 2024-01-22 PROCEDURE — 1036F TOBACCO NON-USER: CPT | Performed by: INTERNAL MEDICINE

## 2024-01-22 PROCEDURE — 3075F SYST BP GE 130 - 139MM HG: CPT | Performed by: INTERNAL MEDICINE

## 2024-01-22 RX ORDER — AMLODIPINE BESYLATE 5 MG/1
5 TABLET ORAL EVERY MORNING
Qty: 90 TABLET | Refills: 3 | Status: SHIPPED | OUTPATIENT
Start: 2024-01-22 | End: 2025-01-21

## 2024-01-22 RX ORDER — ROSUVASTATIN CALCIUM 20 MG/1
20 TABLET, COATED ORAL DAILY
Qty: 90 TABLET | Refills: 3 | Status: SHIPPED | OUTPATIENT
Start: 2024-01-22 | End: 2025-01-21

## 2024-01-22 RX ORDER — ROSUVASTATIN CALCIUM 20 MG/1
20 TABLET, COATED ORAL DAILY
COMMUNITY
Start: 2023-12-13 | End: 2024-01-22 | Stop reason: SDUPTHER

## 2024-01-22 ASSESSMENT — ENCOUNTER SYMPTOMS
LOSS OF SENSATION IN FEET: 0
DEPRESSION: 0
OCCASIONAL FEELINGS OF UNSTEADINESS: 0

## 2024-01-22 NOTE — PROGRESS NOTES
Patient:  Sandra Caicedo  YOB: 1945  MRN: [id number]       HPI:       Sandra Caicedo is a 78 y.o. female who returns today for cardiac follow-up.  She has a history of carotid artery disease with remote left carotid endarterectomy in April 2002.  She has essential hypertension and hyperlipidemia. She does not have any documented atherosclerotic heart or valvular heart disease. She has been intolerant to atorvastatin and pravastatin but has been able to tolerate rosuvastatin 20 mg daily.  She underwent left TKR in March 2022 and recovered fully from that.      She has been doing reasonably well since her last visit.  She is understandably depressed as her sister recently passed away from complications of lung carcinoma and pneumonia.  Sandra does note some intermittent episodes of discomfort in the left upper chest.  She has difficulty characterizing it.  She just notes it is an abnormal feeling that concerns her.  She denies chest pressure or heaviness.  It is not specifically related to exertional activity.  She gets a few episodes on an almost daily basis.  It is not related to eating or drinking.  It does not seem to be related to specific positions or movements.  She denies any orthopnea, PND, or increasing peripheral edema. She denies any palpitations, lightheadedness, near-syncope, or syncope. She denies any fever, chills, or cough. She denies any nausea, vomiting, or diaphoresis. She denies any hemoptysis, hematemesis, melena, or hematochezia. Lab studies January 18, 2024 showed a normal CBC and CMP with exception of glucose 102.  Cholesterol 139 with triglycerides 88, HDL 71, and LDL 70.  Carotid ultrasound January 16, 2024 showed less than 50% stenosis bilaterally.  Moderate bilateral external carotid disease.      Her EKG in the office today shows normal sinus rhythm with PACs, poor R wave progression, nonspecific ST-T wave changes.  In light of her episodes of chest discomfort,  abnormal EKG, and cardiac risk factors including carotid artery disease she will be scheduled for a stress echocardiogram in the near future.  Will call her with these results.   Other details as noted below.     The above portion of this note was dictated by me using voice recognition software. I personally performed the services described in the documentation. The scribe entering the documentation below was in my presence. I affirm that the information is both accurate and complete.       Objective:     Vitals:    01/22/24 1251   BP: 138/68   Pulse: 76       Wt Readings from Last 4 Encounters:   01/27/23 72.6 kg (160 lb)   01/27/22 73 kg (161 lb)   11/02/21 72.1 kg (159 lb)       Allergies:     Allergies   Allergen Reactions    Atorvastatin Unknown    Pravastatin Unknown        Medications:     Current Outpatient Medications   Medication Instructions    acetaminophen-codeine (Tylenol w/ Codeine #3) 300-30 mg tablet 1 tablet, oral, Every 12 hours PRN    amLODIPine (Norvasc) 5 mg tablet 1 tablet, oral, Every morning    aspirin 81 mg EC tablet 1 tablet, oral, Daily    cholecalciferol (Vitamin D-3) 50 MCG (2000 UT) tablet 1 tablet, oral, Daily    cyclobenzaprine (Flexeril) 10 mg tablet 1 tablet, oral, Nightly    diazePAM (VALIUM) 10 mg, oral, 1 hr prior to procedure. May repeat if needed    meloxicam (Mobic) 7.5 mg tablet 1 tablet, oral, Daily    multivitamin (Daily Multi-Vitamin) tablet 1 tablet, oral, Daily    oxyCODONE (ROXICODONE) 5 mg, oral, Every 6 hours PRN    predniSONE (DELTASONE) 50 mg, oral, Daily    rosuvastatin (Crestor) 10 mg tablet 2 tablets, oral, Daily       Physical Examination:   GENERAL:  Well developed, well nourished, in no acute distress.  CHEST:  Symmetric and nontender.  NEURO/PSYCH:  Alert and oriented times three with approppriate behavior and responses.  NECK:  Supple, no JVD, no bruit.  LUNGS:  Clear to auscultation bilaterally, normal respiratory effort.  HEART:  Rate and rhythm regular  with no evident murmur, no gallop appreciated.        There are no rubs, clicks or heaves.  EXTREMITIES:  Warm with good color, no clubbing or cyanosis.  There is no edema noted.  PERIPHERAL VASCULAR:  Pulses present and equally palpable; 2+ throughout.      Problem List:     Patient Active Problem List   Diagnosis    Arteriosclerosis of carotid artery    Back pain    Benign essential hypertension    Bilateral carotid artery stenosis    Hyperlipidemia    Left knee pain    Lumbar radiculopathy, acute    Lumbar stenosis    Primary osteoarthritis of left knee    Status post total knee replacement    Overweight with body mass index (BMI) 25.0-29.9    Hip pain, acute    Hip arthritis    Femoral neck stress fracture       Asessment:     Problem List Items Addressed This Visit             ICD-10-CM    Arteriosclerosis of carotid artery I65.29    Relevant Orders    Follow Up In Cardiology    CBC    Comprehensive Metabolic Panel    Lipid Panel    Benign essential hypertension I10    Relevant Medications    amLODIPine (Norvasc) 5 mg tablet    Other Relevant Orders    Follow Up In Cardiology    [facility]    Comprehensive Metabolic Panel    Lipid Panel    Hyperlipidemia E78.5    Relevant Medications    rosuvastatin (Crestor) 20 mg tablet    Other Relevant Orders    Follow Up In Cardiology    CBC    Comprehensive Metabolic Panel    Lipid Panel    Follow Up In Cardiology    [facility]    Comprehensive Metabolic Panel    Lipid Panel    Other chest pain - Primary R07.89    Relevant Orders    ECG 12 Lead (Completed)    Echocardiogram Stress Test    Follow Up In Cardiology    [facility]    Comprehensive Metabolic Panel    Lipid Panel    Abnormal EKG R94.31    Relevant Orders    Echocardiogram Stress Test    Follow Up In Cardiology    [facility]    Comprehensive Metabolic Panel    Lipid Panel

## 2024-01-31 ENCOUNTER — ANCILLARY PROCEDURE (OUTPATIENT)
Dept: CARDIOLOGY | Facility: CLINIC | Age: 79
End: 2024-01-31
Payer: MEDICARE

## 2024-01-31 DIAGNOSIS — R07.89 OTHER CHEST PAIN: ICD-10-CM

## 2024-01-31 DIAGNOSIS — R07.9 CHEST PAIN, UNSPECIFIED: ICD-10-CM

## 2024-01-31 DIAGNOSIS — R94.31 ABNORMAL EKG: ICD-10-CM

## 2024-01-31 PROCEDURE — 93016 CV STRESS TEST SUPVJ ONLY: CPT | Performed by: INTERNAL MEDICINE

## 2024-01-31 PROCEDURE — 93321 DOPPLER ECHO F-UP/LMTD STD: CPT | Performed by: INTERNAL MEDICINE

## 2024-01-31 PROCEDURE — 93325 DOPPLER ECHO COLOR FLOW MAPG: CPT | Performed by: INTERNAL MEDICINE

## 2024-01-31 PROCEDURE — 93325 DOPPLER ECHO COLOR FLOW MAPG: CPT

## 2024-01-31 PROCEDURE — 93018 CV STRESS TEST I&R ONLY: CPT | Performed by: INTERNAL MEDICINE

## 2024-01-31 PROCEDURE — 93350 STRESS TTE ONLY: CPT | Performed by: INTERNAL MEDICINE

## 2024-02-02 ENCOUNTER — TELEPHONE (OUTPATIENT)
Dept: CARDIOLOGY | Facility: CLINIC | Age: 79
End: 2024-02-02
Payer: MEDICARE

## 2024-02-02 LAB — EJECTION FRACTION APICAL 4 CHAMBER: 70.6

## 2024-02-02 NOTE — TELEPHONE ENCOUNTER
Called and spoke with the patient in regards to message. Patient verbalized understanding. Kandace HERNANDEZ

## 2024-02-02 NOTE — TELEPHONE ENCOUNTER
----- Message from Ha Escobar MD sent at 2/2/2024  4:28 PM EST -----  Stress test reviewed and is normal.  Normal pumping function.  Normal heart valve structure and function.  Nothing to suggest any significant heart artery blockage.  Continue same and follow-up as scheduled.  Thanks.

## 2025-01-14 ENCOUNTER — TELEPHONE (OUTPATIENT)
Dept: CARDIOLOGY | Facility: CLINIC | Age: 80
End: 2025-01-14
Payer: MEDICARE

## 2025-01-14 NOTE — TELEPHONE ENCOUNTER
Called and was unable to get a hold of the patient. Voicemail verified. Left detailed voicemail in regards to message.

## 2025-01-14 NOTE — TELEPHONE ENCOUNTER
----- Message from Ha Escobar sent at 1/14/2025 12:20 PM EST -----  Labs reviewed and are normal.  Can discuss with her at her upcoming office visit.  Thanks.  ----- Message -----  From: Tash Perez  Sent: 1/13/2025   8:17 AM EST  To: Ha Escobar MD

## 2025-01-20 ENCOUNTER — APPOINTMENT (OUTPATIENT)
Dept: CARDIOLOGY | Facility: CLINIC | Age: 80
End: 2025-01-20
Payer: MEDICARE

## 2025-01-20 VITALS
HEART RATE: 80 BPM | SYSTOLIC BLOOD PRESSURE: 142 MMHG | BODY MASS INDEX: 29.48 KG/M2 | HEIGHT: 62 IN | WEIGHT: 160.2 LBS | DIASTOLIC BLOOD PRESSURE: 70 MMHG

## 2025-01-20 DIAGNOSIS — E78.00 PURE HYPERCHOLESTEROLEMIA: ICD-10-CM

## 2025-01-20 DIAGNOSIS — E78.2 MIXED HYPERLIPIDEMIA: ICD-10-CM

## 2025-01-20 DIAGNOSIS — R94.31 ABNORMAL EKG: ICD-10-CM

## 2025-01-20 DIAGNOSIS — I10 BENIGN ESSENTIAL HYPERTENSION: ICD-10-CM

## 2025-01-20 DIAGNOSIS — Z98.890 H/O ENDARTERECTOMY: ICD-10-CM

## 2025-01-20 DIAGNOSIS — R07.89 OTHER CHEST PAIN: ICD-10-CM

## 2025-01-20 DIAGNOSIS — I65.23 ARTERIOSCLEROSIS OF BOTH CAROTID ARTERIES: ICD-10-CM

## 2025-01-20 PROCEDURE — 3077F SYST BP >= 140 MM HG: CPT | Performed by: INTERNAL MEDICINE

## 2025-01-20 PROCEDURE — 99214 OFFICE O/P EST MOD 30 MIN: CPT | Performed by: INTERNAL MEDICINE

## 2025-01-20 PROCEDURE — 3078F DIAST BP <80 MM HG: CPT | Performed by: INTERNAL MEDICINE

## 2025-01-20 PROCEDURE — 1036F TOBACCO NON-USER: CPT | Performed by: INTERNAL MEDICINE

## 2025-01-20 PROCEDURE — 1159F MED LIST DOCD IN RCRD: CPT | Performed by: INTERNAL MEDICINE

## 2025-01-20 RX ORDER — AMLODIPINE BESYLATE 5 MG/1
5 TABLET ORAL
Qty: 90 TABLET | Refills: 3 | Status: SHIPPED | OUTPATIENT
Start: 2025-01-20

## 2025-01-20 RX ORDER — ROSUVASTATIN CALCIUM 20 MG/1
20 TABLET, COATED ORAL DAILY
Qty: 90 TABLET | Refills: 3 | Status: SHIPPED | OUTPATIENT
Start: 2025-01-20 | End: 2026-01-20

## 2025-01-20 RX ORDER — LOSARTAN POTASSIUM 25 MG/1
25 TABLET ORAL DAILY
Qty: 90 TABLET | Refills: 3 | Status: SHIPPED | OUTPATIENT
Start: 2025-01-20 | End: 2026-01-20

## 2025-01-20 NOTE — PATIENT INSTRUCTIONS
NEW:  LOSARTAN 25 MG 1 TABLET DAILY    FASTING LABS, FASTING FROM MIDNIGHT THE NIGHT BEFORE TO BE DONE 1 WEEK PRIOR TO YOUR APPOINTMENT WITH DR KOVACS IN 1 YEAR  DID YOU KNOW  We have a pharmacy here in the [facility].  They can fill all prescriptions, not just cardiac medications.  Prescriptions from other pharmacies can easily be transferred to the  pharmacy by the  pharmacist on site.   pharmacies offer FREE HOME DELIVERY on medications to anywhere in [place]. They can sync your medications. Typically prescriptions can be ready in 10 - 15 minutes. If pharmacy is unable to fill your  prescription or if cost is more than your paying now the Pharmacist can easily transfer back to your Pharmacy of choice. Pharmacy phone # [phone].     Please bring all medicines, vitamins, and herbal supplements with you in original bottles to every appointment! This is the best way to ensure your medication list in your chart is accurate.    Prescriptions will not be filled unless you are compliant with your follow up appointments or have a follow up appointment scheduled as per instruction of your physician. Refills should be requested at the time of your visit.

## 2025-01-20 NOTE — PROGRESS NOTES
Patient:  Sandra Caicedo  YOB: 1945  MRN: [id number]       HPI:       Sandra Caicedo is a 79 y.o. female who returns today for cardiac follow-up.  She has a history of carotid artery disease with remote left carotid endarterectomy in April 2002.  She has essential hypertension and hyperlipidemia. She does not have any documented atherosclerotic heart or valvular heart disease. She has been intolerant to atorvastatin and pravastatin but has been able to tolerate rosuvastatin 20 mg daily.  She underwent left TKR in March 2022 and recovered fully from that.  Carotid ultrasound January 16, 2024 showed less than 50% stenosis bilaterally.  Moderate bilateral external carotid disease.       At the time of her last visit she described some intermittent episodes of chest discomfort.  An exercise stress echocardiogram March 31, 2024 was negative for any exercise-induced chest discomfort, ST changes, or wall motion abnormalities.  Estimated LV ejection fraction of 60 to 65%.  She achieved 94% of MPHR at an energy level of 6.6 METS.      She has been doing well since her last visit.  She denies any recent episodes of chest discomfort.  No shortness of breath.  She denies any orthopnea, PND, or increasing peripheral edema. She denies any palpitations, lightheadedness, near-syncope, or syncope. She denies any fever, chills, or cough. She denies any nausea, vomiting, or diaphoresis. She denies any hemoptysis, hematemesis, melena, or hematochezia. Lab studies January 10, 2024 showed a normal CBC and CMP.  Cholesterol 157 triglycerides 66, HDL 80, and LDL 64.  Her EKG showed normal sinus rhythm with PACs, poor R wave progression, nonspecific ST-T wave changes.  Continue aspirin and rosuvastatin for carotid artery disease.  She will continue on amlodipine amlodipine.  Recent systolic blood pressures have been predominantly in the 130s to low 140s.  I advised her to start on low-dose losartan 25 mg daily.  She will  be scheduled for a follow-up carotid ultrasound.  We will call her with these results.  Other details as noted below.    The above portion of this note was dictated by me using voice recognition software. I personally performed the services described in the documentation. The scribe entering the documentation below was in my presence. I affirm that the information is both accurate and complete.       Objective:     Vitals:    01/20/25 1304   BP: 142/70   Pulse: 80       Wt Readings from Last 4 Encounters:   01/20/25 72.7 kg (160 lb 3.2 oz)   01/22/24 73.4 kg (161 lb 12.8 oz)   01/27/23 72.6 kg (160 lb)   01/27/22 73 kg (161 lb)       Allergies:     No Known Allergies       Medications:     Current Outpatient Medications   Medication Instructions    amLODIPine (NORVASC) 5 mg, oral, Daily before breakfast    aspirin 81 mg EC tablet 1 tablet, Daily    cholecalciferol (Vitamin D-3) 50 MCG (2000 UT) tablet 1 tablet, Daily    diphenhydrAMINE-acetaminophen (Tylenol PM)  mg per tablet 1 tablet, Nightly PRN    meloxicam (Mobic) 7.5 mg tablet 1 tablet, Daily    multivitamin (Daily Multi-Vitamin) tablet 1 tablet, Daily    rosuvastatin (CRESTOR) 20 mg, oral, Daily       Physical Examination:   GENERAL:  Well developed, well nourished, in no acute distress.  CHEST:  Symmetric and nontender.  NEURO/PSYCH:  Alert and oriented times three with approppriate behavior and responses.  NECK:  Supple, no JVD, no bruit.  LUNGS:  Clear to auscultation bilaterally, normal respiratory effort.  HEART:  Rate and rhythm regular with no evident murmur, no gallop appreciated.        There are no rubs, clicks or heaves.  EXTREMITIES:  Warm with good color, no clubbing or cyanosis.  There is no edema noted.  PERIPHERAL VASCULAR:  Pulses present and equally palpable; 2+ throughout.      Lab:     CBC:   Lab Results   Component Value Date    WBC 11.1 03/25/2022    RBC 4.03 03/25/2022    HGB 11.8 (L) 03/25/2022    HCT 37.7 03/25/2022      03/25/2022        CMP:    Lab Results   Component Value Date     03/25/2022    K 3.8 03/25/2022     (H) 03/25/2022    CO2 24 03/25/2022    BUN 16 03/25/2022    CREATININE 0.76 03/25/2022    GLUCOSE 125 (H) 03/25/2022    CALCIUM 9.1 03/25/2022       Lab Results   Component Value Date     03/25/2022     03/14/2022    K 3.8 03/25/2022    K 4.0 03/14/2022     (H) 03/25/2022     03/14/2022    CO2 24 03/25/2022    CO2 27 03/14/2022    BUN 16 03/25/2022    BUN 20 03/14/2022    CREATININE 0.76 03/25/2022    CREATININE 0.73 03/14/2022       CBC:  Lab Results   Component Value Date    WBC 11.1 03/25/2022    WBC 6.4 03/14/2022    RBC 4.03 03/25/2022    RBC 4.98 03/14/2022    HGB 11.8 (L) 03/25/2022    HGB 14.2 03/14/2022    HCT 37.7 03/25/2022    HCT 46.3 (H) 03/14/2022    MCV 94 03/25/2022    MCV 93 03/14/2022    MCHC 31.3 (L) 03/25/2022    MCHC 30.7 (L) 03/14/2022    RDW 13.7 03/25/2022    RDW 13.5 03/14/2022     03/25/2022     03/14/2022       Hepatic Function Panel:    Lab Results   Component Value Date    ALKPHOS 45 03/14/2022    ALT 18 03/14/2022    AST 18 03/14/2022    PROT 6.7 03/14/2022    BILITOT 0.4 03/14/2022     PT/INR:    Lab Results   Component Value Date    PROTIME 10.9 03/14/2022    INR 0.9 03/14/2022       Problem List:     Patient Active Problem List   Diagnosis    Arteriosclerosis of carotid artery    Back pain    Benign essential hypertension    Bilateral carotid artery stenosis    Hyperlipidemia    Left knee pain    Lumbar radiculopathy, acute    Lumbar stenosis    Primary osteoarthritis of left knee    Status post total knee replacement    Overweight with body mass index (BMI) 25.0-29.9    Hip pain, acute    Hip arthritis    Femoral neck stress fracture    Other chest pain    Abnormal EKG       Asessment:     Problem List Items Addressed This Visit             ICD-10-CM    Arteriosclerosis of carotid artery I65.29    Relevant Orders    Comprehensive  Metabolic Panel    Lipid Panel    Follow Up In Cardiology    Vascular US Carotid Artery Duplex Bilateral    Benign essential hypertension I10    Relevant Medications    losartan (Cozaar) 25 mg tablet    amLODIPine (Norvasc) 5 mg tablet    Other Relevant Orders    Comprehensive Metabolic Panel    Lipid Panel    Follow Up In Cardiology    Hyperlipidemia E78.5    Relevant Medications    rosuvastatin (Crestor) 20 mg tablet    Other Relevant Orders    Comprehensive Metabolic Panel    Lipid Panel    Follow Up In Cardiology    Comprehensive Metabolic Panel    Lipid Panel    Follow Up In Cardiology    Other chest pain R07.89    Relevant Orders    Comprehensive Metabolic Panel    Lipid Panel    Follow Up In Cardiology    Abnormal EKG R94.31    Relevant Orders    Comprehensive Metabolic Panel    Lipid Panel    Follow Up In Cardiology    H/O endarterectomy Z98.890    Relevant Orders    Vascular US Carotid Artery Duplex Bilateral

## 2025-02-04 ENCOUNTER — ANCILLARY PROCEDURE (OUTPATIENT)
Dept: CARDIOLOGY | Facility: HOSPITAL | Age: 80
End: 2025-02-04
Payer: MEDICARE

## 2025-02-04 DIAGNOSIS — I65.23 ARTERIOSCLEROSIS OF BOTH CAROTID ARTERIES: ICD-10-CM

## 2025-02-04 DIAGNOSIS — Z98.890 H/O ENDARTERECTOMY: ICD-10-CM

## 2025-02-04 PROCEDURE — 93880 EXTRACRANIAL BILAT STUDY: CPT

## 2025-02-04 PROCEDURE — 93880 EXTRACRANIAL BILAT STUDY: CPT | Performed by: INTERNAL MEDICINE

## 2025-06-11 ENCOUNTER — HOSPITAL ENCOUNTER (OUTPATIENT)
Dept: RADIOLOGY | Facility: CLINIC | Age: 80
Discharge: HOME | End: 2025-06-11
Payer: MEDICARE

## 2025-06-11 ENCOUNTER — OFFICE VISIT (OUTPATIENT)
Dept: ORTHOPEDIC SURGERY | Facility: CLINIC | Age: 80
End: 2025-06-11
Payer: MEDICARE

## 2025-06-11 DIAGNOSIS — M25.552 PAIN OF LEFT HIP: ICD-10-CM

## 2025-06-11 DIAGNOSIS — M25.551 PAIN OF RIGHT HIP: ICD-10-CM

## 2025-06-11 DIAGNOSIS — M53.3 SI (SACROILIAC) JOINT DYSFUNCTION: Primary | ICD-10-CM

## 2025-06-11 PROCEDURE — 73502 X-RAY EXAM HIP UNI 2-3 VIEWS: CPT | Mod: LT

## 2025-06-11 PROCEDURE — 73502 X-RAY EXAM HIP UNI 2-3 VIEWS: CPT | Mod: LEFT SIDE | Performed by: ORTHOPAEDIC SURGERY

## 2025-06-11 PROCEDURE — 99214 OFFICE O/P EST MOD 30 MIN: CPT | Performed by: ORTHOPAEDIC SURGERY

## 2025-06-11 PROCEDURE — 99213 OFFICE O/P EST LOW 20 MIN: CPT | Performed by: ORTHOPAEDIC SURGERY

## 2025-06-11 RX ORDER — METHYLPREDNISOLONE 4 MG/1
TABLET ORAL
Qty: 21 TABLET | Refills: 0 | Status: SHIPPED | OUTPATIENT
Start: 2025-06-11

## 2025-06-11 NOTE — PROGRESS NOTES
Subjective    Patient ID: Sandra    Chief Complaint:   Chief Complaint   Patient presents with    Left Hip - Pain     History of present illness    79-year-old female presented to clinic today for evaluation left-sided hip pain.  She states over the last 2 months she has had increasing pain over the posterior lateral left hip region.  No groin pain present.  She states she gets increased pain with walking her dog or walking for prolonged periods.  She has tried Tylenol at home which does seem to help somewhat.  We have been treating her previously for right hip osteoarthritis with fluoroscopy guided steroid injections which do seem to help.  She went to her PCP and a heel lift was placed and other right shoe due to leg length inequality.      Past medical , Surgical, Family and social history reviewed.      Physical exam  General: No acute distress and breathing comfortably.  Patient is pleasant and cooperative with the examination.    Extremity  The left hip is neurovascular intact.  Demonstrates good range of motion no groin pain.  Negative straight leg raise test.  Mild tenderness palpation over the posterior lateral left buttock.  No tenderness over the greater troch.  No instability.  Compartment soft.    Diagnostics  Please see dictated x-ray report  Imaging  No results found.    Cardiology, Vascular, and Other Imaging  No other imaging results found for the past 7 days       Procedure  [ none]    Assessment  Left-sided SI joint dysfunction with low back pain    Treatment plan  1.  At this time she will continue weightbearing activity as tolerated.  She will use heating pad.  2.  Prescription Medrol Dosepak sent to the pharmacy.  We will get her started in outpatient physical therapy a referral was entered for pelvic stability and core strength.  3.  She will follow-up with us in 6 weeks for reevaluation without x-ray.  For complete plan and/or surgical details, please refer to Dr. Kumari's portion of  the split/shared dictation.  4.  All of the patient's questions were answered.    Orders Placed This Encounter    XR hip left with pelvis when performed 2 or 3 views       This note was prepared using voice recognition software.  The details of this note are correct and have been reviewed, and corrected to the best of my ability.  Some grammatical areas may persist related to the Dragon software    Nick Yuen PA-C, [facility]  [facility] [place]    [phone]     In a face-to-face encounter performed today, I Fadi Kumari MD performed a history and physical examination, discussed pertinent diagnostic studies if indicated, and discussed diagnosis and management strategies with both the patient and the midlevel provider.  I reviewed the midlevel's note and agree with the documented findings and plan of care.  Greater than 50% of the evaluation and treatment decision was performed by the physician myself during today's visit.    79-year-old female Fc previously presents with complaints of pain in the posterior aspect of more her left than her right but a little bit on both sides it is worse with activity mainly in the hip.  Not really into the groin she is having a bit of a leg length inequality and she is tried a heel lift which seems to be helping.  She is try Tylenol as well.  On examination she has good range of motion of both hips most of her tenderness along the SI joint posteriorly straight leg raise testing is negative brisk cap refill compartments are soft calf is nontender.  X-rays are obtained today see my dictated x-ray report.  I reassured her hip joints look good I think her issues more related to her pelvis and her SI joint I recommended physical therapy, Medrol Dosepak, follow-up 6 weeks.          This note was prepared using voice recognition software.  The details of this note are correct and have been reviewed, and corrected to the best of my ability.  Some grammatical  areas may persist related to the Dragon software    Fadi Kumari MD  Senior Attending Physician  [facility]    [phone]

## 2025-06-17 ENCOUNTER — EVALUATION (OUTPATIENT)
Dept: PHYSICAL THERAPY | Facility: CLINIC | Age: 80
End: 2025-06-17
Payer: MEDICARE

## 2025-06-17 DIAGNOSIS — M25.552 PAIN OF LEFT HIP: ICD-10-CM

## 2025-06-17 DIAGNOSIS — M53.3 SI (SACROILIAC) JOINT DYSFUNCTION: ICD-10-CM

## 2025-06-17 PROCEDURE — 97161 PT EVAL LOW COMPLEX 20 MIN: CPT | Mod: GP

## 2025-06-17 PROCEDURE — 97110 THERAPEUTIC EXERCISES: CPT | Mod: GP

## 2025-06-17 ASSESSMENT — PATIENT HEALTH QUESTIONNAIRE - PHQ9
1. LITTLE INTEREST OR PLEASURE IN DOING THINGS: NOT AT ALL
2. FEELING DOWN, DEPRESSED OR HOPELESS: NOT AT ALL
SUM OF ALL RESPONSES TO PHQ9 QUESTIONS 1 AND 2: 0

## 2025-06-17 ASSESSMENT — ENCOUNTER SYMPTOMS
DEPRESSION: 0
LOSS OF SENSATION IN FEET: 0
OCCASIONAL FEELINGS OF UNSTEADINESS: 0

## 2025-06-17 NOTE — PROGRESS NOTES
"Patient Name: Sandra Caicedo  MRN: [id number]  Time Calculation  Start Time: 1529  Stop Time: 1555  Time Calculation (min): 26 min  PT Evaluation Time Entry  PT Evaluation (Low) Time Entry: 16  PT Therapeutic Procedures Time Entry  Therapeutic Exercise Time Entry: 10          Current Problem  1. Pain of left hip  Referral to Physical Therapy    Follow Up In Physical Therapy      2. SI (sacroiliac) joint dysfunction  Referral to Physical Therapy    Follow Up In Physical Therapy        Insurance    Visit #1  MEDICARE/ AARP 2410 ($0 USED ) COPAY 0 DED (MET)  COVERAGE 80/100 OOP (MET) AARP TO FOLLOW MEDICARE  NO AUTH REQ       Subjective     General: Pt is a 79 y.o female presenting to clinic with c/o L hip and SI pain that has present for the last 3 months. She notes using/prescribed Z-pack which has improved symptoms 100%. She Notes L hip and lower back which worsens with prolonged walking. Describes symptoms as throbbing that only comes with prolonged walking. denies numbness/tingling into alison LE. She states insidious onset of symptoms but noted similar symptom on R-side that completely resolved with injection for 1 year plus. She notes attending [employer] chair yoga classes 2x/week and walking her dog daily at least 1 mile. Currently having difficulty with prolonged walking only. Main goal with therapy at this time is to improve walking tolerance and decrease hip discomfort.    Walking tolerance: 15 minutes  Standing tolerance: 60+ minutes  Sitting tolerance: 60+ minutes    Home set-up: Ranch style + WIS  Stair negotiation: Reciprocal    Pain:  Current:  0/10  High: 8/10  Low: 0/10      Aggravating Factors: Prolonged walking  Alleviating Factors: Stretching, resting    Reviewed medical screening form with pt and medical screening assessed    Imaging:   X-Ray L hip 6/11/25:  \"FINDINGS:  AP pelvis and lateral view of the left hip. Moderate to severe right  hip arthritis with joint space narrowing and marginal " "osteophyte  formation mild to moderate left hip arthritis with joint space  narrowing significant degenerative scoliosis lumbar spine no evidence  of fracture dislocation or other bony abnormality\"    Objective     Spine Musculoskeletal Exam    Gait    Gait is normal.    Palpation    Thoracolumbar    Tenderness: none    Right      Muscle tone: normal    Left      Muscle tone: normal    Range of Motion    Thoracolumbar    Range of motion is normal.         Strength    Thoracolumbar    Thoracolumbar motor exam is normal.       Right      Extensor hallucis longus: 5/5.       Tibialis anterior: 5/5.       Tibialis posterior: 5/5.       Plantar flexion: 5/5.       Peroneals: 5/5.       Quadriceps: 5/5.       Hamstrin/5.       Hip abductors: 5/5.       Hip flexion: 5/5.       Hip adduction: 5/5.        Left      Extensor hallucis longus: 5/5.       Tibialis anterior: 5/5.       Tibialis posterior: 5/5.       Plantar flexion: 5/5.       Peroneals: 5/5.       Quadriceps: 5/5.       Hamstrin/5.       Hip abductors: 5/5.       Hip flexion: 5/5.       Hip adduction: 5/5.      Sensory    Thoracolumbar    Thoracolumbar sensation is normal.    Reflexes    Thoracolumbar reflexes are normal.    Neurovascular    Thoracolumbar    Thoracolumbar neurovascular exam is normal.    Special Tests    Thoracolumbar      Right      BRENDA test: positive (10 in from EOB)      SLR: no back or leg pain      Left      BRENDA test: positive (10 in from EOB)      SLR: no back or leg pain    Spine special tests additional comments: Laslett's cluster: Negative  90/90: 150 deg alison     Hip Musculoskeletal Exam  Gait    Gait is normal.    Palpation    Right        Right hip palpation is normal.      Tenderness: none    Left        Left hip palpation is normal.    Range of Motion    Right      Right hip range of motion is within functional limits.     Left      Left hip range of motion is within functional limits.     Strength    Right      " Extension: 5/5.       Flexion: 5/5.       Internal rotation: 4+/5.       External rotation: 4+/5.       Adduction: 5/5.       Abduction: 5/5.     Left      Extension: 5/5.       Flexion: 5/5.       Internal rotation: 4+/5.       External rotation: 4+/5.       Adduction: 5/5.     Neurovascular    Right        Right hip neurovascular exam is normal.    Left        Left hip neurovascular exam is normal.    Special Tests    Right      BRENDA test (right): positive (10 in from EOB)      Impingement test: negative    Left      BRENDA test (left): positive (10 in from EOB)      Impingement test: negative         Outcome Measures:  LEFS:  73/80     Treatment: See HEP below  Supine hookling Figure 4 stretch :30 sec x2  BRENDA stretch :30 sec x 2  TA + SKFO vs GTB x10 3 sec end-range hold  TA + glute bridge with GTB around knees x15    EDUCATION/HEP:  Pt was educated on the potential carryover of symptomology from lumbar spine and educated on the importance of core stability and lateral gluteal strength. She was instructed to continue with chair yoga frequency and add in HEP 3x/week for strengthening exercises and daily stretching. Pt was educated on POC, expected timeline and interventions. Pt acknowledged good understanding and was in agreement to all above information.      Assessment:   Pt is a 79 y.o female presenting with the following deficits:  L hip pain present only with prolonged walking . These deficits have lead to functional impairments with  prolonged walking, home and yard care/participation in minimal leisure activities. Recommend skilled PT to address the aforementioned deficits and allow pt to restore PLOF and maximize functional capacity. Anticipate good prognosis given age, current symptoms, attitude towards therapy, active nature. Patient would benefit from PT services to address current impairments and facilitate improvement in current activity limits. Educated patient on current POC, initial HEP and  current examination findings. Patient verbalized understanding of all education and instruction provided today.    Clinical Presentation: Stable   Level of Complexity: Low     Goals:    Patient will be independent with HEP.    Patient will improve LEFS score to >/= 75/80    Patient will improve BRENDA test </= 8 in from EOB to demonstrate improved hip mobility.    Patient will self-report being able to walk >/= 1 mile with only 1/10 pain at most.    Patient will report 0/10 back/hip pain with ADL performance.      Plan  1 follow-up in 4 weeks    Planned interventions include: aquatic therapy, biofeedback, cryotherapy, dry needling, edema control, education/instruction, electrical stimulation, gait training, home program, hot pack, kinesiotaping, manual therapy, neuromuscular re-education, self care/home management, therapeutic activities, therapeutic exercises, ultrasound and vasopneumatic device w/ cold.

## 2025-07-11 ENCOUNTER — TELEPHONE (OUTPATIENT)
Dept: PHYSICAL THERAPY | Facility: CLINIC | Age: 80
End: 2025-07-11
Payer: MEDICARE

## 2025-07-11 NOTE — TELEPHONE ENCOUNTER
7/11/2025  Patient lvm to cx her upcoming appt kiara'zuri on 7/15/2025 @10am w/[place].  Patient did not state a reason for the cx, nor did she request to be called back to r/s.

## 2025-07-15 ENCOUNTER — APPOINTMENT (OUTPATIENT)
Dept: PHYSICAL THERAPY | Facility: CLINIC | Age: 80
End: 2025-07-15
Payer: MEDICARE

## 2025-07-29 ENCOUNTER — OFFICE VISIT (OUTPATIENT)
Dept: ORTHOPEDIC SURGERY | Facility: CLINIC | Age: 80
End: 2025-07-29
Payer: MEDICARE

## 2025-07-29 DIAGNOSIS — M25.552 PAIN OF LEFT HIP: ICD-10-CM

## 2025-07-29 DIAGNOSIS — M53.3 SI (SACROILIAC) JOINT DYSFUNCTION: Primary | ICD-10-CM

## 2025-07-29 PROCEDURE — 99214 OFFICE O/P EST MOD 30 MIN: CPT | Performed by: ORTHOPAEDIC SURGERY

## 2025-07-29 PROCEDURE — 99212 OFFICE O/P EST SF 10 MIN: CPT | Performed by: ORTHOPAEDIC SURGERY

## 2025-07-29 RX ORDER — METHYLPREDNISOLONE 4 MG/1
TABLET ORAL
Qty: 21 TABLET | Refills: 0 | Status: SHIPPED | OUTPATIENT
Start: 2025-07-29

## 2025-07-29 NOTE — PROGRESS NOTES
Subjective    Patient ID: Sandra    Chief Complaint:   Chief Complaint   Patient presents with    Left Hip - Follow-up     SI joint dysfunction with low back pain  S/P: Medrol Dosepak, physical therapy     History of present illness    80-year-old female presents the clinic today for follow-up evaluation left-sided SI joint dysfunction.  She states physical therapy and home exercises were helping somewhat.  2 weeks ago she was outside cleaning her fence and felt a strain in the left buttock region.  She has been dealing with hamstring tightness and pain ever since.  The Medrol Dosepak we put her on back in June did not really do much for her at that time.  No numbness tingling no fevers chills reported.  Had some difficulty getting up and down ever since her strain.      Past medical , Surgical, Family and social history reviewed.      Physical exam  General: No acute distress and breathing comfortably.  Patient is pleasant and cooperative with the examination.    Extremity  Left hip is neurovascular intact.  Good range of motion.  Pain over the buttock proximal insertion of the hamstring.  She also has pain in the left hamstring with resisted knee flexion.  No instability.  Compartment soft.  Capillary refill within normal is distally.  No tenderness palpation left hip bursa region.  Mild tenderness palpation left SI joint.    Diagnostics  [ none]  Imaging  No results found.    Cardiology, Vascular, and Other Imaging  No other imaging results found for the past 7 days       Procedure  [ none]    Assessment  Left-sided SI joint dysfunction  Left hamstring strain, acute    Treatment plan  1.  At this time we will go ahead and try her on another Medrol Dosepak.  2.  Prescription outpatient physical therapy was given to the patient for SI joint dysfunction to continue with this as well as adding left hamstring strain to her plan.  3.  She will follow-up with us approximately 6 weeks if she still having issues at  that time.  For complete plan and/or surgical details, please refer to Dr. Kumari's portion of the split/shared dictation.  4.  All of the patient's questions were answered.    No orders of the defined types were placed in this encounter.      This note was prepared using voice recognition software.  The details of this note are correct and have been reviewed, and corrected to the best of my ability.  Some grammatical areas may persist related to the Dragon software    Nick Yuen PA-C, [facility]  [facility] [place]    [phone]    In a face-to-face encounter performed today, I Fadi Kumari MD performed a history and physical examination, discussed pertinent diagnostic studies if indicated, and discussed diagnosis and management strategies with both the patient and the midlevel provider.  I reviewed the midlevel's note and agree with the documented findings and plan of care.  Greater than 50% of the evaluation and treatment decision was performed by the physician myself during today's visit.    80-year-old female I have seen previously presents with 2-week history of injury to her left hamstring she states she was washing her fence and felt something pull in the posterior aspect of her left hip.  On examination she has tenderness palpation posteriorly along the hamstring distally she is neurologically intact good muscle strength brisk cap refill compartments soft calf is nontender.  Impression strain sprain hamstring left.  Plan is Medrol Dosepak, physical therapy, follow-up 6 weeks.          This note was prepared using voice recognition software.  The details of this note are correct and have been reviewed, and corrected to the best of my ability.  Some grammatical areas may persist related to the Dragon software    Fadi Kumari MD  Senior Attending Physician  [facility]    [phone]

## 2025-08-08 ENCOUNTER — EVALUATION (OUTPATIENT)
Dept: PHYSICAL THERAPY | Facility: CLINIC | Age: 80
End: 2025-08-08
Payer: MEDICARE

## 2025-08-08 DIAGNOSIS — M25.552 PAIN OF LEFT HIP: ICD-10-CM

## 2025-08-08 DIAGNOSIS — M53.3 SI (SACROILIAC) JOINT DYSFUNCTION: ICD-10-CM

## 2025-08-08 PROCEDURE — 97110 THERAPEUTIC EXERCISES: CPT | Mod: GP

## 2025-08-08 PROCEDURE — 97161 PT EVAL LOW COMPLEX 20 MIN: CPT | Mod: GP

## 2025-08-08 NOTE — PROGRESS NOTES
Physical Therapy Evaluation    Patient Name: Sandra Caicedo  MRN: [id number]                       Current Problem  1. Pain of left hip  Follow Up In Physical Therapy      2. SI (sacroiliac) joint dysfunction  Follow Up In Physical Therapy        Insurance    Insurance reviewed   Visit number: **  Approved number of visits: **  **insurance name  **visits/yr or copay  **auth/no auth      Subjective   General:  Pt comes in today with complaints of LBP/hip pain on the left side. SH states she can only walk about 15 minutes with her dog before it starts bothering. She states the pain can be sharp and annoying. She denies any numbness or tingling in the legs. She states when this happens her pain is around a 6-7/10. She states she was dealing with a hamstring strain but that has since resolved. She denies any saddle anesthesia, denies any bowel or bladder function. She states this is only ever her L side. She states the pain usually resolves within 10 minutes PMH: BL knee replacements  Occupation: Retired  PLOF: independent with all activities  Goal for Therapy: Make it better  Home Environment: House, 2-3STE, lives alone    Precautions:    Pain:  REDUCES SYMPTOMS: Tylenol, rest    PROVOKES SYMPTOMS: prolonged walking     Red Flags: Do you have any of the following? No  Fever/chills, unexplained weight changes, dizziness/fainting, unexplained change in bowel or bladder functions, unexplained malaise or muscle weakness, , numbness or tingling  Does report night sweats    Reviewed medical screening form with pt and medical screening assessed    Imaging:     Objective   PALPATION: ***            BALANCE: ***            GAIT: ***            POSTURE: ***    AROM    Lumbar flexion: ***      Lumbar extension: ***      Lumbar sidebending right: ***      Sidebending left: ***      Lumbar rotation right: ***     Rotation left: ***      Hip flexion right:  ***    Hip flexion left: ***      Hip extension right: ***     Hip  extension left: ***      Hip ER supine right: ***     Hip ER supine left: ***      Hip IR supine right: ***     Hip IR supine left: ***      MMT:    Right hip ER: ***      Left hip ER: ***      Right hip IR: ***     Left hip IR: ***      (L3-L4) Right quadriceps: ***     Left quadriceps: ***      (L1-L2) Right iliopsoas: ***      Left iliopsoas: ***      (S2) Right hip abductors supine: ***     Left hip abductors supine: ***      (L1-L2) Right hip adductors supine: ***     Left hip adductors supine: ***      (L5-S1) Right gluteus arielle: ***     Left gluteus arielle: ***      (S1-S2) Right hamstrings: ***      Left hamstrings: ***      Lower abdominals: ***      Upper abdominals: ***      Flexibility:    Right iliopsoas: ***     Left iliopsoas: ***      Right rectus femoris:  ***    Left rectus femoris: ***      Right hamstring: ***     Left hamstring: ***      Right ITB: ***     Left ITB: ***      Right piriformis: ***     Left piriformis: ***      Right quadriceps: ***     Left quadriceps: ***        Level:   Dermatome:                  L1         ***                   L2         ***                                        L3         ***                                          L4         ***                                           L5         ***                            S1         ***                                                   Special Tests:  Active Straight Leg Raise Test: Right ***  Left ***  Prone Instability Test: Right ***  Left ***  Slump Test: Right ***  Left ***   Crossed Straight Leg Test: Right ***  Left ***  SI Distraction: Right ***  Left ***  SI Compression: Right ***  Left ***  Thigh Thrust: Right ***  Left ***  Sacral Thrust: Right ***  Left ***  Gaenslen's Test: Right ***  Left ***  Clonus ***   Outcome Measures:  {PT Outcome Measures:37131}     Treatment: See HEP below    EDUCATION/HEP:    Assessment:      Clinical Presentation: Stable / Evolving /  Unstable    Level of Complexity: Low / Moderate / High    Goals:      Plan

## 2025-08-11 NOTE — PROGRESS NOTES
Physical Therapy Evaluation and Treatment    Patient Name: Sandra Caicedo  MRN: [id number]  YOB: 1945  Encounter Date: 8/8/2025    Time Entry:  Time Calculation  Start Time: 1045  Stop Time: 1124  Time Calculation (min): 39 min  PT Evaluation Time Entry  PT Evaluation (Low) Time Entry: 25  PT Therapeutic Procedures Time Entry  Therapeutic Exercise Time Entry: 9  Manual Therapy Time Entry: 5                   Rehab Insurance Information:   Visit Count: 1  Auth Required: No  Medicare cert. dates: 08/08/25  Through: 11/05/25     Additional Authorization/Insurance Information: Medicare No auth required    Rehab Falls Risk Assessment:  Fall Risk Indicated: No    Problem List Items Addressed This Visit           ICD-10-CM    Pain of left hip M25.552    SI (sacroiliac) joint dysfunction M53.3            Subjective   Patient Education  Do you or those around you need extra help because of problems with hearing, speaking, seeing, moving around, or learning?: No  Are you comfortable filing out medical forms?: Yes  Key Learner  Key Learner: Patient  Primary language for learning: English  History of Present Illness  Sandra is a 80 y.o. female who reports to physical therapy with a chief concern of LBP/hip pain.                 History of Present Condition/Illness: Pt comes in today with complaints of LBP/hip pain on the left side. SH states she can only walk about 15 minutes with her dog before it starts bothering. She states the pain can be sharp and annoying. She denies any numbness or tingling in the legs. She states when this happens her pain is around a 6-7/10. She states she was dealing with a hamstring strain but that has since resolved. She denies any saddle anesthesia, denies any bowel or bladder function. She states this is only ever her L side. She states the pain usually resolves within 10 minutes PMH: BL knee replacements    Activities of Daily Living  Social history was obtained from Patient.                Previously independent with activities of daily living? Yes     Currently independent with activities of daily living? Yes          Previously independent with instrumental activities of daily living? Yes     Currently independent with instrumental activities of daily living? Yes              Pain     Patient reports a current pain level of 7/10. Pain at best is reported as 6/10. Pain at worst is reported as 7/10.   Location: Near SIJ  Clinical Progression (since onset): Stable  Pain Qualities: Sharp, Pulling, Tenderness, Aching  Pain-Relieving Factors: Activity modification, Rest  Pain-Aggravating Factors: Walking         Review of Systems  Patient denies: Bladder Incontinence, Bowel Incontinence, Dizziness, Fainting, Fever, Lower Extremity Neurological Deficits, Night Sweats/Chills, Saddle Numbness, and Weight Loss         Living Arrangements  Living Situation  Living Arrangements: Alone    House, 2-3STE, lives alone    Education/Employment       Employment Status: Retired  Patient does not report that condition impacts: ability to work            Objective      Posture  Patient presents with a Forward head position.     Shoulders are Rounded. Right pelvis characteristics: Anterior Superior Iliac Spine Higher  Left pelvis characteristics: Posterior Superior Iliac Spine Higher  Right Leg True Length Discrepancy (cm): 73  Left Leg True Length Discrepancy (cm): 73   Discrepancy is Functional.       Lower Extremity Sensation  General Lumbar/Lower Extremity Sensation  Intact: Right and Left  Right Lumbar/Lower Extremity Sensation  Intact: Light Touch, Sharp/Dull Discrimination, Static Two Point Discrimination, Dynamic Two Point Discrimination, Kinesthesia, and Proprioception  Right Lumbar/Lower Extremity Sensation Stocking Glove Pattern: No    Left Lumbar/Lower Extremity Sensation  Intact: Light Touch, Static Two Point Discrimination, Dynamic Two Point Discrimination, Sharp/Dull Discrimination, Kinesthesia,  and Proprioception  Left Lumbar/Lower Extremity Sensation Stocking Glove Pattern: No           Vertebral Palpation             No TTPTTP noted on SI Joint L         Hip Range of Motion   Right Hip   Active (deg) Passive (deg) Pain   Flexion         Extension         ABduction         ADduction         External Rotation 90/90  (limited)       External Rotation Prone         Internal Rotation 90/90  (limited)       Internal Rotation Prone           Left Hip   Active (deg) Passive (deg) Pain   Flexion         Extension         ABduction         ADduction         External Rotation 90/90  (limited)       External Rotation Prone         Internal Rotation 90/90  (limited)       Internal Rotation Prone           WFL: Right Flexion and Left Flexion                   Hip Strength - Planes of Motion   Right Strength Right Pain Left Strength Left Pain   Flexion (L2) 4+   4     Extension           ABduction 4-   4-     ADduction 4-   4-     Internal Rotation 4-   4-     External Rotation 4-   4-       Knee Strength   Right Strength Right Pain Left Strength Left Pain   Flexion (S2) 4+   4+     Prone Flexion           Extension (L3) 4+   4+           Lumbar/Pelvic Girdle Special Tests       Lumbar Tests - SLR and Tension  Negative: Right Passive Straight Leg Raise and Right Crossed Straight Leg Raise                 Pelvic Girdle / Sacrum Tests  Positive: Left Sacral Spring and Left Sacroiliac Compression  Negative: Right BRENDA, Left BRENDA, Right FADIR, Left FADIR, Left Gillet's, Right Sacral Spring, and Right Sacroiliac Compression  Positive: Left Stork  Negative: Right Stork         Hip Special Tests  Intra-Articular/Impingement Tests  Negative: Right BRENDA, Left BRENDA, Right FADIR, and Left FADIR  Sacroiliac Joint Tests  Positive: Left Compression  Negative: Left Gillet's and Right Compression               Gait Analysis  Gait Pattern: Antalgic                     Activities   Therapeutic Exercise  Therapeutic Exercise  Performed: Yes  Therapeutic Exercise Activity 1: Bridge x10  Therapeutic Exercise Activity 2: Supine Hip abd GTB x10  Therapeutic Exercise Activity 3: Supine Hip add x10  Therapeutic Exercise Activity 4: Supine piriformis stretch 2x30s  Therapeutic Exercise Activity 5: Table Push down x10               Manual Therapy  Manual Therapy Performed: Yes  Manual Therapy Activity 1: MET to correct Pelvic alignment                                       Assessment/Plan   Assessment  Sandra presents with a condition of Low complexity.   Presentation of Symptoms: Stable  Will Comorbidities Impact Care: No            Assessment Details: 81 y/o pt who presents with LBP, hip pain, dec ROM, dec strength, dec flexibility, and dec functional mobility. Functional limtiations include standing and prolonged walking. Pt with positive SI cluster testing this date. Able to improve pelvic alignment with MET to hamstrings. Pt denies any red flag symptoms at this time. Pt tolerated all exercises well this date. Pt will benefit from skilled therapy in order to improve pain, ROM, strength, flexibility, and functional mobility.    Goals  Active       PT Goal 1       Start:  08/08/25    Expected End:  10/06/25       Pt will report dec of 6% on SERAFIN (MDC) in order to improve functional mobility             PT Goal 2       Start:  08/08/25    Expected End:  10/06/25       ?Pt will be independent with HEP            PT Goal 3       Start:  08/08/25    Expected End:  10/06/25       Pt will demonstrate an increase in Lumbar  ROM to WNL in order to return to ADLs            PT Goal 4       Start:  08/08/25    Expected End:  10/06/25       Pt will demonstrate global Hip strength to 4+/5 in order to return to ADLs           PT Goal 5       Start:  08/08/25    Expected End:  10/06/25       Pt will report dec in pain by 1-2 points in order to improve functional mobility          PT Goal 1       Start:  08/08/25    Expected End:  10/06/25       Pt will  report 75% improvement in function in order to return to ADLs               Education  Education was done with Patient. The patient's learning style includes Demonstration. The patient Demonstrates understanding and Verbalizes understanding.         Access Code: [email or web address] URL: [email or web address] Date: 08/08/2025 Prepared by: Fior Chen Exercises - Table Push Down  - 1 x daily - 7 x weekly - 2 sets - 10 reps - 2-3s hold - Supine Hip Adduction Isometric with Ball  - 1 x daily - 7 x weekly - 2 sets - 10 reps - Hooklying Clamshell with Resistance  - 1 x daily - 7 x weekly - 2 sets - 10 reps - Supine Bridge  - 1 x daily - 7 x weekly - 2 sets - 10 reps - Supine Piriformis Stretch with Foot on Ground  - 1 x daily - 7 x weekly - 2 sets - 30s hold    Plan  From a physical therapy perspective, the patient would benefit from: Skilled Rehab Services    Planned therapy interventions include: Therapeutic exercise, Therapeutic activities, Neuromuscular re-education, Manual therapy, ADLs/IADLs, Aquatic therapy, and Gait training.    Planned modalities to include: Electrical stimulation - attended.                   This plan was discussed with Patient.   Discussion participants: Agreed Upon Plan of Care  Plan details: 1 recheck in 2-3 weeks

## 2025-08-22 ENCOUNTER — TREATMENT (OUTPATIENT)
Dept: PHYSICAL THERAPY | Facility: CLINIC | Age: 80
End: 2025-08-22
Payer: MEDICARE

## 2025-08-22 DIAGNOSIS — M53.3 SI (SACROILIAC) JOINT DYSFUNCTION: ICD-10-CM

## 2025-08-22 DIAGNOSIS — M25.552 PAIN OF LEFT HIP: ICD-10-CM

## 2025-08-22 PROCEDURE — 97110 THERAPEUTIC EXERCISES: CPT | Mod: GP

## 2026-01-05 ENCOUNTER — APPOINTMENT (OUTPATIENT)
Dept: CARDIOLOGY | Facility: CLINIC | Age: 81
End: 2026-01-05
Payer: MEDICARE